# Patient Record
Sex: FEMALE | Race: BLACK OR AFRICAN AMERICAN | NOT HISPANIC OR LATINO | Employment: FULL TIME | RURAL
[De-identification: names, ages, dates, MRNs, and addresses within clinical notes are randomized per-mention and may not be internally consistent; named-entity substitution may affect disease eponyms.]

---

## 2022-08-10 ENCOUNTER — HOSPITAL ENCOUNTER (OUTPATIENT)
Facility: HOSPITAL | Age: 32
Discharge: HOME | End: 2022-08-11
Attending: HOSPITALIST | Admitting: HOSPITALIST
Payer: COMMERCIAL

## 2022-08-10 DIAGNOSIS — E86.0 DEHYDRATION: Primary | ICD-10-CM

## 2022-08-10 DIAGNOSIS — O46.8X1 SUBCHORIONIC HEMATOMA IN FIRST TRIMESTER: ICD-10-CM

## 2022-08-10 DIAGNOSIS — Z3A.08 8 WEEKS GESTATION OF PREGNANCY: ICD-10-CM

## 2022-08-10 DIAGNOSIS — R55 SYNCOPE: ICD-10-CM

## 2022-08-10 DIAGNOSIS — O41.8X10 SUBCHORIONIC HEMATOMA IN FIRST TRIMESTER: ICD-10-CM

## 2022-08-10 PROBLEM — R19.7 NAUSEA VOMITING AND DIARRHEA: Status: ACTIVE | Noted: 2022-08-10

## 2022-08-10 PROBLEM — R11.2 NAUSEA VOMITING AND DIARRHEA: Status: ACTIVE | Noted: 2022-08-10

## 2022-08-10 LAB
ALBUMIN SERPL BCP-MCNC: 3.2 G/DL (ref 3.5–5)
ALBUMIN/GLOB SERPL: 0.7 {RATIO}
ALP SERPL-CCNC: 100 U/L (ref 37–98)
ALT SERPL W P-5'-P-CCNC: 20 U/L (ref 13–56)
ANION GAP SERPL CALCULATED.3IONS-SCNC: 16 MMOL/L (ref 7–16)
AST SERPL W P-5'-P-CCNC: 11 U/L (ref 15–37)
BACTERIA #/AREA URNS HPF: ABNORMAL /HPF
BASOPHILS # BLD AUTO: 0.02 K/UL (ref 0–0.2)
BASOPHILS NFR BLD AUTO: 0.1 % (ref 0–1)
BILIRUB SERPL-MCNC: 0.2 MG/DL (ref 0–1.2)
BILIRUB UR QL STRIP: ABNORMAL
BUN SERPL-MCNC: 7 MG/DL (ref 7–18)
BUN/CREAT SERPL: 10 (ref 6–20)
CALCIUM SERPL-MCNC: 9 MG/DL (ref 8.5–10.1)
CHLORIDE SERPL-SCNC: 100 MMOL/L (ref 98–107)
CLARITY UR: ABNORMAL
CO2 SERPL-SCNC: 23 MMOL/L (ref 21–32)
COLOR UR: YELLOW
CREAT SERPL-MCNC: 0.67 MG/DL (ref 0.55–1.02)
DIFFERENTIAL METHOD BLD: ABNORMAL
EGFR (NO RACE VARIABLE) (RUSH/TITUS): 121 ML/MIN/1.73M²
EOSINOPHIL # BLD AUTO: 0.06 K/UL (ref 0–0.5)
EOSINOPHIL NFR BLD AUTO: 0.4 % (ref 1–4)
ERYTHROCYTE [DISTWIDTH] IN BLOOD BY AUTOMATED COUNT: 18.2 % (ref 11.5–14.5)
FLUAV AG UPPER RESP QL IA.RAPID: NEGATIVE
FLUBV AG UPPER RESP QL IA.RAPID: NEGATIVE
GLOBULIN SER-MCNC: 4.9 G/DL (ref 2–4)
GLUCOSE SERPL-MCNC: 103 MG/DL (ref 74–106)
GLUCOSE UR STRIP-MCNC: NEGATIVE MG/DL
HCT VFR BLD AUTO: 29.9 % (ref 38–47)
HGB BLD-MCNC: 9.3 G/DL (ref 12–16)
KETONES UR STRIP-SCNC: ABNORMAL MG/DL
LEUKOCYTE ESTERASE UR QL STRIP: ABNORMAL
LYMPHOCYTES # BLD AUTO: 0.67 K/UL (ref 1–4.8)
LYMPHOCYTES NFR BLD AUTO: 4.8 % (ref 27–41)
MAGNESIUM SERPL-MCNC: 1.6 MG/DL (ref 1.7–2.3)
MCH RBC QN AUTO: 24.5 PG (ref 27–31)
MCHC RBC AUTO-ENTMCNC: 31.1 G/DL (ref 32–36)
MCV RBC AUTO: 78.7 FL (ref 80–96)
MONOCYTES # BLD AUTO: 0.91 K/UL (ref 0–0.8)
MONOCYTES NFR BLD AUTO: 6.5 % (ref 2–6)
MPC BLD CALC-MCNC: 7.8 FL (ref 9.4–12.4)
NEUTROPHILS # BLD AUTO: 12.3 K/UL (ref 1.8–7.7)
NEUTROPHILS NFR BLD AUTO: 88.2 % (ref 53–65)
NITRITE UR QL STRIP: NEGATIVE
PH UR STRIP: 6.5 PH UNITS
PLATELET # BLD AUTO: 502 K/UL (ref 150–400)
POTASSIUM SERPL-SCNC: 3.5 MMOL/L (ref 3.5–5.1)
PROT SERPL-MCNC: 8.1 G/DL (ref 6.4–8.2)
PROT UR QL STRIP: 100
RAPID GROUP A STREP: NEGATIVE
RBC # BLD AUTO: 3.8 M/UL (ref 4.2–5.4)
RBC # UR STRIP: NEGATIVE /UL
RBC #/AREA URNS HPF: ABNORMAL /HPF
SARS-COV+SARS-COV-2 AG RESP QL IA.RAPID: NEGATIVE
SODIUM SERPL-SCNC: 135 MMOL/L (ref 136–145)
SP GR UR STRIP: 1.02
SQUAMOUS #/AREA URNS LPF: ABNORMAL /LPF
TROPONIN I SERPL HS-MCNC: <4 PG/ML
UROBILINOGEN UR STRIP-ACNC: 0.2 MG/DL
WBC # BLD AUTO: 13.96 K/UL (ref 4.5–11)
WBC #/AREA URNS HPF: ABNORMAL /HPF

## 2022-08-10 PROCEDURE — 93010 ELECTROCARDIOGRAM REPORT: CPT | Mod: ,,, | Performed by: INTERNAL MEDICINE

## 2022-08-10 PROCEDURE — G0378 HOSPITAL OBSERVATION PER HR: HCPCS

## 2022-08-10 PROCEDURE — 83735 ASSAY OF MAGNESIUM: CPT | Performed by: NURSE PRACTITIONER

## 2022-08-10 PROCEDURE — 87428 SARSCOV & INF VIR A&B AG IA: CPT | Performed by: NURSE PRACTITIONER

## 2022-08-10 PROCEDURE — 85025 COMPLETE CBC W/AUTO DIFF WBC: CPT | Performed by: NURSE PRACTITIONER

## 2022-08-10 PROCEDURE — 96361 HYDRATE IV INFUSION ADD-ON: CPT | Performed by: NURSE PRACTITIONER

## 2022-08-10 PROCEDURE — 25000003 PHARM REV CODE 250: Performed by: NURSE PRACTITIONER

## 2022-08-10 PROCEDURE — 99284 PR EMERGENCY DEPT VISIT,LEVEL IV: ICD-10-PCS | Mod: ,,, | Performed by: NURSE PRACTITIONER

## 2022-08-10 PROCEDURE — 93005 ELECTROCARDIOGRAM TRACING: CPT

## 2022-08-10 PROCEDURE — 81001 URINALYSIS AUTO W/SCOPE: CPT | Performed by: NURSE PRACTITIONER

## 2022-08-10 PROCEDURE — 84484 ASSAY OF TROPONIN QUANT: CPT | Performed by: NURSE PRACTITIONER

## 2022-08-10 PROCEDURE — 99285 EMERGENCY DEPT VISIT HI MDM: CPT | Mod: 25 | Performed by: NURSE PRACTITIONER

## 2022-08-10 PROCEDURE — 63600175 PHARM REV CODE 636 W HCPCS: Performed by: NURSE PRACTITIONER

## 2022-08-10 PROCEDURE — 36415 COLL VENOUS BLD VENIPUNCTURE: CPT | Performed by: NURSE PRACTITIONER

## 2022-08-10 PROCEDURE — 96374 THER/PROPH/DIAG INJ IV PUSH: CPT | Performed by: NURSE PRACTITIONER

## 2022-08-10 PROCEDURE — 93010 EKG 12-LEAD: ICD-10-PCS | Mod: ,,, | Performed by: INTERNAL MEDICINE

## 2022-08-10 PROCEDURE — 87880 STREP A ASSAY W/OPTIC: CPT | Performed by: NURSE PRACTITIONER

## 2022-08-10 PROCEDURE — 99284 EMERGENCY DEPT VISIT MOD MDM: CPT | Mod: ,,, | Performed by: NURSE PRACTITIONER

## 2022-08-10 PROCEDURE — 80053 COMPREHEN METABOLIC PANEL: CPT | Performed by: NURSE PRACTITIONER

## 2022-08-10 RX ORDER — ONDANSETRON 2 MG/ML
4 INJECTION INTRAMUSCULAR; INTRAVENOUS
Status: COMPLETED | OUTPATIENT
Start: 2022-08-10 | End: 2022-08-10

## 2022-08-10 RX ORDER — AMLODIPINE BESYLATE 10 MG/1
10 TABLET ORAL EVERY MORNING
Status: DISCONTINUED | OUTPATIENT
Start: 2022-08-11 | End: 2022-08-11 | Stop reason: HOSPADM

## 2022-08-10 RX ORDER — LEVETIRACETAM 500 MG/1
1000 TABLET ORAL 2 TIMES DAILY
COMMUNITY

## 2022-08-10 RX ORDER — LEVETIRACETAM 250 MG/1
500 TABLET ORAL NIGHTLY
Status: DISCONTINUED | OUTPATIENT
Start: 2022-08-10 | End: 2022-08-11 | Stop reason: HOSPADM

## 2022-08-10 RX ORDER — ACETAMINOPHEN 500 MG
1000 TABLET ORAL EVERY 6 HOURS PRN
Status: DISCONTINUED | OUTPATIENT
Start: 2022-08-10 | End: 2022-08-11 | Stop reason: HOSPADM

## 2022-08-10 RX ORDER — FOLIC ACID 1 MG/1
2 TABLET ORAL 2 TIMES DAILY
COMMUNITY

## 2022-08-10 RX ORDER — SODIUM CHLORIDE 0.9 % (FLUSH) 0.9 %
10 SYRINGE (ML) INJECTION EVERY 12 HOURS
Status: DISCONTINUED | OUTPATIENT
Start: 2022-08-10 | End: 2022-08-11 | Stop reason: HOSPADM

## 2022-08-10 RX ORDER — AMLODIPINE BESYLATE 10 MG/1
10 TABLET ORAL EVERY MORNING
COMMUNITY

## 2022-08-10 RX ORDER — ACETAMINOPHEN 500 MG
1000 TABLET ORAL EVERY 6 HOURS PRN
Status: ON HOLD | COMMUNITY
End: 2022-08-11 | Stop reason: HOSPADM

## 2022-08-10 RX ORDER — LEVETIRACETAM 250 MG/1
1000 TABLET ORAL DAILY
Status: DISCONTINUED | OUTPATIENT
Start: 2022-08-11 | End: 2022-08-11 | Stop reason: HOSPADM

## 2022-08-10 RX ORDER — SODIUM CHLORIDE 9 MG/ML
1000 INJECTION, SOLUTION INTRAVENOUS CONTINUOUS
Status: ACTIVE | OUTPATIENT
Start: 2022-08-10 | End: 2022-08-11

## 2022-08-10 RX ORDER — PRENATAL WITH FERROUS FUM AND FOLIC ACID 3080; 920; 120; 400; 22; 1.84; 3; 20; 10; 1; 12; 200; 27; 25; 2 [IU]/1; [IU]/1; MG/1; [IU]/1; MG/1; MG/1; MG/1; MG/1; MG/1; MG/1; UG/1; MG/1; MG/1; MG/1; MG/1
1 TABLET ORAL DAILY
Status: DISCONTINUED | OUTPATIENT
Start: 2022-08-11 | End: 2022-08-11 | Stop reason: HOSPADM

## 2022-08-10 RX ORDER — FOLIC ACID 1 MG/1
2 TABLET ORAL DAILY
Status: DISCONTINUED | OUTPATIENT
Start: 2022-08-11 | End: 2022-08-11 | Stop reason: HOSPADM

## 2022-08-10 RX ORDER — ONDANSETRON 2 MG/ML
4 INJECTION INTRAMUSCULAR; INTRAVENOUS EVERY 6 HOURS PRN
Status: DISCONTINUED | OUTPATIENT
Start: 2022-08-10 | End: 2022-08-11 | Stop reason: HOSPADM

## 2022-08-10 RX ORDER — LEVETIRACETAM 250 MG/1
1000 TABLET ORAL 2 TIMES DAILY
Status: DISCONTINUED | OUTPATIENT
Start: 2022-08-10 | End: 2022-08-10

## 2022-08-10 RX ADMIN — ONDANSETRON 4 MG: 2 INJECTION INTRAMUSCULAR; INTRAVENOUS at 06:08

## 2022-08-10 RX ADMIN — SODIUM CHLORIDE 1000 ML: 9 INJECTION, SOLUTION INTRAVENOUS at 07:08

## 2022-08-10 RX ADMIN — SODIUM CHLORIDE, POTASSIUM CHLORIDE, SODIUM LACTATE AND CALCIUM CHLORIDE 500 ML: 600; 310; 30; 20 INJECTION, SOLUTION INTRAVENOUS at 06:08

## 2022-08-10 RX ADMIN — LEVETIRACETAM 500 MG: 250 TABLET, FILM COATED ORAL at 09:08

## 2022-08-10 NOTE — ED PROVIDER NOTES
Encounter Date: 8/10/2022       History     Chief Complaint   Patient presents with    Loss of Consciousness     Pt reports possible syncopal episode approx 20 min PTA, was sitting in car with car in park, remembers everything before and after episode, reports 8 weeks pregnant with N/V x 2 weeks, hx HTN, seizures, aao upon arrival     Twila Dejesus is a 32 y.o. female presenting to ED after a syncopal episode while sitting in her parked car at [employer] 15-20 min PTA. She states that she was at the counter and began to feel weak so she went to her vehicle and that is where she passed out. She is unclear how long she was unconscious but state that she woke up and her son was on the phone with a family member telling them that she had passed out. She is 8 weeks pregnant and has had US to confirm IUP but states that she was told she had a hematoma that could cause complications with the pregnancy. She has a scheduled appointment with her OB in 1 week. She states that she has been experiencing a lot of vomiting and diarrhea over the last week so she contacted her OB and they called her in Zofran which she has been taking but says it only works for approx 45 min-1 hr before she is nauseated and vomiting again. No sick contacts. She also notes that she has been experiencing chills, sore throat and congestion over the last few days but denies fever. She reports that this is her third episode of syncope since she became pregnant. With the first occurrence she was told it was heat exhaustion. The second occurrence was when they found the hematoma. She also experienced rectal bleeding a few weeks ago but bleeding has subsided and she has a colonoscopy scheduled for September. She is a A2. First miscarriage was around 4 weeks gestation, second was approx 8 weeks gestation. She Currently in NAD. VSS at this time.         Review of patient's allergies indicates:   Allergen Reactions    Iodopropynyl butylcarbamate Dermatitis     Ciprofloxacin Hives and Rash    Latex Hives and Rash     History reviewed. No pertinent past medical history.  History reviewed. No pertinent surgical history.  History reviewed. No pertinent family history.  Social History     Tobacco Use    Smoking status: Never Smoker    Smokeless tobacco: Never Used   Substance Use Topics    Alcohol use: Not Currently    Drug use: Never     Review of Systems   Constitutional: Positive for activity change, appetite change, chills and fatigue. Negative for fever.   HENT: Positive for congestion and sore throat.    Eyes: Negative for visual disturbance.   Respiratory: Negative for cough, chest tightness and shortness of breath.    Cardiovascular: Negative for chest pain and palpitations.   Gastrointestinal: Positive for diarrhea, nausea and vomiting. Negative for abdominal distention, abdominal pain, anal bleeding and blood in stool.   Genitourinary: Negative for decreased urine volume, dysuria, frequency and urgency.   Neurological: Positive for syncope, weakness and light-headedness. Negative for dizziness and headaches.   Psychiatric/Behavioral: Negative for confusion.   All other systems reviewed and are negative.      Physical Exam     Initial Vitals [08/10/22 1642]   BP Pulse Resp Temp SpO2   120/76 81 16 98.3 °F (36.8 °C) 100 %      MAP       --         Physical Exam    Nursing note and vitals reviewed.  Constitutional: She appears well-developed and well-nourished. She has a sickly appearance. No distress.   HENT:   Head: Normocephalic and atraumatic.   Nose: Nose normal.   Mouth/Throat: Mucous membranes are dry.   Eyes: EOM are normal. Pupils are equal, round, and reactive to light. No scleral icterus.   Neck: Neck supple.   Normal range of motion.  Cardiovascular: Normal rate, regular rhythm, normal heart sounds and intact distal pulses.   Pulmonary/Chest: Breath sounds normal. No respiratory distress.   Abdominal: Abdomen is soft. She exhibits no distension.  Bowel sounds are increased. There is no abdominal tenderness. No hernia.   No right CVA tenderness.  No left CVA tenderness.   Musculoskeletal:         General: Normal range of motion.      Cervical back: Normal range of motion and neck supple.     Neurological: She is alert and oriented to person, place, and time. She has normal strength. No cranial nerve deficit or sensory deficit. GCS score is 15. GCS eye subscore is 4. GCS verbal subscore is 5. GCS motor subscore is 6.   Skin: Skin is warm and dry. Capillary refill takes less than 2 seconds.         Medical Screening Exam   See Full Note    ED Course   Procedures  Labs Reviewed   COMPREHENSIVE METABOLIC PANEL - Abnormal; Notable for the following components:       Result Value    Sodium 135 (*)     Albumin 3.2 (*)     Globulin 4.9 (*)     Alk Phos 100 (*)     AST 11 (*)     All other components within normal limits   MAGNESIUM - Abnormal; Notable for the following components:    Magnesium 1.6 (*)     All other components within normal limits   URINALYSIS, REFLEX TO URINE CULTURE - Abnormal; Notable for the following components:    Leukocytes, UA Trace (*)     Protein,   (*)     Bilirubin, UA Small (*)     All other components within normal limits   CBC WITH DIFFERENTIAL - Abnormal; Notable for the following components:    WBC 13.96 (*)     RBC 3.80 (*)     Hemoglobin 9.3 (*)     Hematocrit 29.9 (*)     MCV 78.7 (*)     MCH 24.5 (*)     MCHC 31.1 (*)     RDW 18.2 (*)     Platelet Count 502 (*)     MPV 7.8 (*)     Neutrophils % 88.2 (*)     Lymphocytes % 4.8 (*)     Neutrophils, Abs 12.30 (*)     Lymphocytes, Absolute 0.67 (*)     Monocytes % 6.5 (*)     Eosinophils % 0.4 (*)     Monocytes, Absolute 0.91 (*)     All other components within normal limits   URINALYSIS, MICROSCOPIC - Abnormal; Notable for the following components:    WBC, UA 5-10 (*)     Bacteria, UA Few (*)     Squamous Epithelial Cells, UA Many (*)     All other components within normal  limits   TROPONIN I - Normal   SARS-COV2 (COVID) W/ FLU ANTIGEN - Normal    Narrative:     Negative SARS-CoV results should not be used as the sole basis for treatment or patient management decisions; negative results should be considered in the context of a patient's recent exposures, history and the presene of clinical signs and symptoms consistent with COVID-19.  Negative results should be treated as presumptive and confirmed by molecular assay, if necessary for patient management.   CBC W/ AUTO DIFFERENTIAL    Narrative:     The following orders were created for panel order CBC auto differential.  Procedure                               Abnormality         Status                     ---------                               -----------         ------                     CBC with Differential[[id number]]        Abnormal            Final result               Manual Differential[[id number]]                              Final result                 Please view results for these tests on the individual orders.   MANUAL DIFFERENTIAL     EKG Readings: (Independently Interpreted)   Rhythm: Sinus Arrhythmia. Heart Rate: 78.     ECG Results          EKG 12-lead (In process)  Result time 08/10/22 16:54:35    In process by Interface, Lab In [facility] (08/10/22 16:54:35)                 Narrative:    Test Reason : R55,    Vent. Rate : 078 BPM     Atrial Rate : 078 BPM     P-R Int : 168 ms          QRS Dur : 084 ms      QT Int : 362 ms       P-R-T Axes : 053 036 039 degrees     QTc Int : 412 ms    Normal sinus rhythm with sinus arrhythmia  Normal ECG  No previous ECGs available    Referred By: [email or web address]   SELF           Confirmed By:                             Imaging Results    None          Medications   ondansetron injection 4 mg (4 mg Intravenous Given 8/10/22 1811)   lactated ringers bolus 500 mL (0 mLs Intravenous Stopped 8/10/22 1907)                 ED Course as of 08/10/22 1946   Wed Aug 10, 2022   1825 Patient reports to  be feeling some better. States that she has been taking her meds as directed but has been unable to hold down meds. This episode is not consistent with her typical seizure. Discussed results with patient and need for telemed consult. She is in agreement.  [LP]   1906 [facility] returned call. Connected with Dr. Glover. Discussed patient case and today's results. Video consult initiated and he was able to visualize and speak with patient. He discussed findings and plan. Patient V/U and is in agreement with suggested plan. No questions or concerns at this time. Call ended at 1922.      [LP]   1913 Reports is feeling better.  Awaiting Telemed consult [CG]   1926 Telemed consult via audio/ video with Dr. Williamson, who suggest Admit for Tele, IV hydration and US in the morning. He states she does not need to transfer for specialty since is only 8 weeks gestation and has had US to confirm IUP.  Will Call hospitalist on call.  [CG]   1933 Discussed pt with DR Velez who states is ok to admit for OBS for IV hydration and US in AM.  She suggest since she has had diarrhea to check for enteric pathogen, Cdiff and fecal leukocytes.  [CG]      ED Course User Index  [CG] SONU Godfrey  [LP] SONU Villeda          Clinical Impression:   Final diagnoses:  [R55] Syncope  [E86.0] Dehydration (Primary)  [Z3A.08] 8 weeks gestation of pregnancy          ED Disposition Condition    Observation               SONU Godfrey  08/10/22 1946

## 2022-08-11 VITALS
TEMPERATURE: 98 F | SYSTOLIC BLOOD PRESSURE: 125 MMHG | BODY MASS INDEX: 38.25 KG/M2 | HEART RATE: 81 BPM | OXYGEN SATURATION: 100 % | WEIGHT: 238 LBS | DIASTOLIC BLOOD PRESSURE: 87 MMHG | HEIGHT: 66 IN | RESPIRATION RATE: 20 BRPM

## 2022-08-11 PROBLEM — E86.0 DEHYDRATION: Status: RESOLVED | Noted: 2022-08-10 | Resolved: 2022-08-11

## 2022-08-11 PROBLEM — R11.2 NAUSEA VOMITING AND DIARRHEA: Status: RESOLVED | Noted: 2022-08-10 | Resolved: 2022-08-11

## 2022-08-11 PROBLEM — R19.7 NAUSEA VOMITING AND DIARRHEA: Status: RESOLVED | Noted: 2022-08-10 | Resolved: 2022-08-11

## 2022-08-11 LAB
ANION GAP SERPL CALCULATED.3IONS-SCNC: 11 MMOL/L (ref 7–16)
BASOPHILS # BLD AUTO: 0.03 K/UL (ref 0–0.2)
BASOPHILS NFR BLD AUTO: 0.3 % (ref 0–1)
BUN SERPL-MCNC: 5 MG/DL (ref 7–18)
BUN/CREAT SERPL: 9 (ref 6–20)
CALCIUM SERPL-MCNC: 8.1 MG/DL (ref 8.5–10.1)
CHLORIDE SERPL-SCNC: 105 MMOL/L (ref 98–107)
CO2 SERPL-SCNC: 23 MMOL/L (ref 21–32)
CREAT SERPL-MCNC: 0.57 MG/DL (ref 0.55–1.02)
DIFFERENTIAL METHOD BLD: ABNORMAL
EGFR (NO RACE VARIABLE) (RUSH/TITUS): 126 ML/MIN/1.73M²
EOSINOPHIL # BLD AUTO: 0.08 K/UL (ref 0–0.5)
EOSINOPHIL NFR BLD AUTO: 0.9 % (ref 1–4)
ERYTHROCYTE [DISTWIDTH] IN BLOOD BY AUTOMATED COUNT: 18.3 % (ref 11.5–14.5)
GLUCOSE SERPL-MCNC: 99 MG/DL (ref 74–106)
HCT VFR BLD AUTO: 26 % (ref 38–47)
HGB BLD-MCNC: 8 G/DL (ref 12–16)
LYMPHOCYTES # BLD AUTO: 1.67 K/UL (ref 1–4.8)
LYMPHOCYTES NFR BLD AUTO: 18.3 % (ref 27–41)
MCH RBC QN AUTO: 24.2 PG (ref 27–31)
MCHC RBC AUTO-ENTMCNC: 30.8 G/DL (ref 32–36)
MCV RBC AUTO: 78.8 FL (ref 80–96)
MONOCYTES # BLD AUTO: 1.08 K/UL (ref 0–0.8)
MONOCYTES NFR BLD AUTO: 11.9 % (ref 2–6)
MPC BLD CALC-MCNC: 7.8 FL (ref 9.4–12.4)
NEUTROPHILS # BLD AUTO: 6.25 K/UL (ref 1.8–7.7)
NEUTROPHILS NFR BLD AUTO: 68.6 % (ref 53–65)
PLATELET # BLD AUTO: 458 K/UL (ref 150–400)
POTASSIUM SERPL-SCNC: 3.6 MMOL/L (ref 3.5–5.1)
RBC # BLD AUTO: 3.3 M/UL (ref 4.2–5.4)
SODIUM SERPL-SCNC: 135 MMOL/L (ref 136–145)
WBC # BLD AUTO: 9.11 K/UL (ref 4.5–11)

## 2022-08-11 PROCEDURE — G0378 HOSPITAL OBSERVATION PER HR: HCPCS

## 2022-08-11 PROCEDURE — 36415 COLL VENOUS BLD VENIPUNCTURE: CPT | Performed by: NURSE PRACTITIONER

## 2022-08-11 PROCEDURE — 99235 HOSP IP/OBS SAME DATE MOD 70: CPT | Mod: ,,, | Performed by: HOSPITALIST

## 2022-08-11 PROCEDURE — 96361 HYDRATE IV INFUSION ADD-ON: CPT

## 2022-08-11 PROCEDURE — A4216 STERILE WATER/SALINE, 10 ML: HCPCS | Performed by: NURSE PRACTITIONER

## 2022-08-11 PROCEDURE — 25000003 PHARM REV CODE 250: Performed by: NURSE PRACTITIONER

## 2022-08-11 PROCEDURE — 99235 PR OBSERV/HOSP SAME DATE,LEVL IV: ICD-10-PCS | Mod: ,,, | Performed by: HOSPITALIST

## 2022-08-11 PROCEDURE — 85025 COMPLETE CBC W/AUTO DIFF WBC: CPT | Performed by: NURSE PRACTITIONER

## 2022-08-11 PROCEDURE — 80048 BASIC METABOLIC PNL TOTAL CA: CPT | Performed by: NURSE PRACTITIONER

## 2022-08-11 RX ADMIN — AMLODIPINE BESYLATE 10 MG: 10 TABLET ORAL at 06:08

## 2022-08-11 RX ADMIN — FOLIC ACID 2 MG: 1 TABLET ORAL at 09:08

## 2022-08-11 RX ADMIN — SODIUM CHLORIDE 1000 ML: 9 INJECTION, SOLUTION INTRAVENOUS at 12:08

## 2022-08-11 RX ADMIN — SODIUM CHLORIDE, PRESERVATIVE FREE 10 ML: 5 INJECTION INTRAVENOUS at 08:08

## 2022-08-11 RX ADMIN — PRENATAL VITAMINS-IRON FUMARATE 27 MG IRON-FOLIC ACID 0.8 MG TABLET 1 TABLET: at 09:08

## 2022-08-11 NOTE — PLAN OF CARE
Problem:  Fall Injury Risk  Goal: Absence of Fall, Infant Drop and Related Injury  Outcome: Ongoing, Progressing     Problem: Adult Inpatient Plan of Care  Goal: Plan of Care Review  Outcome: Ongoing, Progressing  Goal: Patient-Specific Goal (Individualized)  Outcome: Ongoing, Progressing  Goal: Absence of Hospital-Acquired Illness or Injury  Outcome: Ongoing, Progressing  Goal: Optimal Comfort and Wellbeing  Outcome: Ongoing, Progressing  Goal: Readiness for Transition of Care  Outcome: Ongoing, Progressing     Problem: Fall Injury Risk  Goal: Absence of Fall and Fall-Related Injury  Outcome: Ongoing, Progressing     Problem: Pain Acute  Goal: Acceptable Pain Control and Functional Ability  Outcome: Ongoing, Progressing     Problem: Infection  Goal: Absence of Infection Signs and Symptoms  Outcome: Ongoing, Progressing     Problem: Fatigue  Goal: Improved Activity Tolerance  Outcome: Ongoing, Progressing

## 2022-08-11 NOTE — ED NOTES
Assisted to BR to void. Denies feeling faint.  Back to bed and maintaining Normal Sinus Rhythm on monitor.

## 2022-08-11 NOTE — PLAN OF CARE
Problem:  Fall Injury Risk  Goal: Absence of Fall, Infant Drop and Related Injury  Outcome: Adequate for Care Transition     Problem: Adult Inpatient Plan of Care  Goal: Plan of Care Review  Outcome: Adequate for Care Transition  Goal: Patient-Specific Goal (Individualized)  Outcome: Adequate for Care Transition  Goal: Absence of Hospital-Acquired Illness or Injury  Outcome: Adequate for Care Transition  Goal: Optimal Comfort and Wellbeing  Outcome: Adequate for Care Transition  Goal: Readiness for Transition of Care  Outcome: Adequate for Care Transition     Problem: Fall Injury Risk  Goal: Absence of Fall and Fall-Related Injury  Outcome: Adequate for Care Transition     Problem: Pain Acute  Goal: Acceptable Pain Control and Functional Ability  Outcome: Adequate for Care Transition     Problem: Infection  Goal: Absence of Infection Signs and Symptoms  Outcome: Adequate for Care Transition     Problem: Fatigue  Goal: Improved Activity Tolerance  Outcome: Adequate for Care Transition

## 2022-08-11 NOTE — SUBJECTIVE & OBJECTIVE
History reviewed. No pertinent past medical history.    Past Surgical History:   Procedure Laterality Date    APPENDECTOMY       SECTION  2015    CHOLECYSTECTOMY  2016    TONSILLECTOMY  1999       Review of patient's allergies indicates:   Allergen Reactions    Iodopropynyl butylcarbamate Dermatitis    Ciprofloxacin Hives and Rash    Latex Hives and Rash       No current facility-administered medications on file prior to encounter.     Current Outpatient Medications on File Prior to Encounter   Medication Sig    acetaminophen (TYLENOL) 500 MG tablet Take 1,000 mg by mouth every 6 (six) hours as needed for Pain.    amLODIPine (NORVASC) 10 MG tablet Take 10 mg by mouth every morning.    folic acid (FOLVITE) 1 MG tablet Take 2 mg by mouth 2 (two) times daily.    levETIRAcetam (KEPPRA) 500 MG Tab Take 1,000 mg by mouth 2 (two) times daily.    prenatal vit no.124/iron/folic (PRENATAL VITAMIN ORAL) Take by mouth. Pt states she takes the gummy type prenatal vitamin daily.     Family History       Problem Relation (Age of Onset)    No Known Problems Mother, Father, Sister, Brother          Tobacco Use    Smoking status: Never Smoker    Smokeless tobacco: Never Used   Substance and Sexual Activity    Alcohol use: Not Currently    Drug use: Never    Sexual activity: Yes     Partners: Male     Review of Systems   Constitutional:  Negative for appetite change, chills and fever.   Respiratory:  Negative for cough, shortness of breath and wheezing.    Cardiovascular:  Negative for chest pain, palpitations and leg swelling.   Gastrointestinal:  Positive for diarrhea, nausea and vomiting. Negative for abdominal distention.   Genitourinary:  Negative for dysuria.   Musculoskeletal:  Positive for arthralgias.   Skin:  Negative for rash.   Neurological:  Positive for seizures. Negative for dizziness and syncope.   Psychiatric/Behavioral:  Negative for agitation, behavioral problems and confusion.    All other systems  reviewed and are negative.  Objective:     Vital Signs (Most Recent):  Temp: 98.1 °F (36.7 °C) (08/11/22 0736)  Pulse: 81 (08/11/22 0736)  Resp: 20 (08/11/22 0736)  BP: 125/87 (08/11/22 0736)  SpO2: 100 % (08/11/22 0736) Vital Signs (24h Range):  Temp:  [98.1 °F (36.7 °C)-99.1 °F (37.3 °C)] 98.1 °F (36.7 °C)  Pulse:  [81-95] 81  Resp:  [15-20] 20  SpO2:  [98 %-100 %] 100 %  BP: (120-135)/(66-87) 125/87     Weight: 108 kg (238 lb)  Body mass index is 38.41 kg/m².    Physical Exam  Vitals reviewed.   Constitutional:       General: She is not in acute distress.     Appearance: Normal appearance.   HENT:      Head: Normocephalic and atraumatic.   Eyes:      General: No scleral icterus.     Extraocular Movements: Extraocular movements intact.      Conjunctiva/sclera: Conjunctivae normal.      Pupils: Pupils are equal, round, and reactive to light.   Cardiovascular:      Rate and Rhythm: Normal rate and regular rhythm.      Heart sounds: No murmur heard.    No friction rub. No gallop.   Pulmonary:      Effort: Pulmonary effort is normal. No respiratory distress.      Breath sounds: Normal breath sounds. No wheezing or rales.   Abdominal:      General: Abdomen is flat. Bowel sounds are normal. There is no distension.      Palpations: Abdomen is soft.      Tenderness: There is no abdominal tenderness. There is no guarding.   Musculoskeletal:         General: Swelling present.      Comments: R knee brace   Skin:     General: Skin is warm and dry.      Coloration: Skin is not jaundiced.      Findings: No rash.   Neurological:      General: No focal deficit present.      Mental Status: She is alert and oriented to person, place, and time.      Sensory: No sensory deficit.      Motor: No weakness.   Psychiatric:         Mood and Affect: Mood normal.         Thought Content: Thought content normal.         Judgment: Judgment normal.         CRANIAL NERVES     CN III, IV, VI   Pupils are equal, round, and reactive to light.      Significant Labs: All pertinent labs within the past 24 hours have been reviewed.  Recent Lab Results         08/11/22  0607   08/10/22  2140   08/10/22  1752   08/10/22  1708        Influenza B, Molecular       Negative       Albumin/Globulin Ratio     0.7         Albumin     3.2         Alkaline Phosphatase     100         ALT     20         Anion Gap 11     16         Appearance, UA       Cloudy       AST     11         Bacteria, UA       Few       Baso # 0.03     0.02         Basophil % 0.3     0.1         Bilirubin (UA)       Small       BILIRUBIN TOTAL     0.2         BUN 5     7         BUN/CREAT RATIO 9     10         Calcium 8.1     9.0         Chloride 105     100         CO2 23     23         Color, UA       Yellow       COVID-19 Ag       Negative       Creatinine 0.57     0.67         Differential Type Scan Smear     Manual         eGFR 126     121         Eos # 0.08     0.06         Eosinophil % 0.9     0.4         Globulin, Total     4.9         Glucose 99     103         Glucose, UA       Negative       Hematocrit 26.0     29.9         Hemoglobin 8.0     9.3         Influenza A       Negative       Ketones, UA       Trace       Leukocytes, UA       Trace       Lymph # 1.67     0.67         Lymph % 18.3     4.8         Magnesium     1.6         MCH 24.2     24.5         MCHC 30.8     31.1         MCV 78.8     78.7         Mono # 1.08     0.91         Mono % 11.9     6.5         MPV 7.8     7.8         Neutrophils, Abs 6.25     12.30         Neutrophils Relative 68.6     88.2         NITRITE UA       Negative       Occult Blood UA       Negative       pH, UA       6.5       Platelets 458     502         Potassium 3.6     3.5         PROTEIN TOTAL     8.1         Protein, UA       100        RAPID STREP A SCREEN   Negative           RBC 3.30     3.80         RBC, UA       0-3       RDW 18.3     18.2         Sodium 135     135         Specific [place], UA       1.025       Squam Epithel, UA       Many        Troponin I High Sensitivity     <4.0         UROBILINOGEN UA       0.2       WBC, UA       5-10       WBC 9.11     13.96                 Significant Imaging: I have reviewed all pertinent imaging results/findings within the past 24 hours.

## 2022-08-11 NOTE — NURSING
4 eye assessment         8/11/2022  07:00  Skin Assessed: no new pressure injury noted.  Report received: LEONOR Flores

## 2022-08-11 NOTE — DISCHARGE SUMMARY
[facility]  Hospital Medicine  Discharge Summary      Patient Name: Twila Dejesus  MRN: [id number]  Patient Class: OP- Observation  Admission Date: 8/10/2022  Hospital Length of Stay: 0 days  Discharge Date and Time:  2022 9:22 AM  Attending Physician: Kain Nicole DO   Discharging Provider: aKin Nicole DO  Primary Care Provider: Miguel Kelly NP      HPI:   Ms Dejesus is a 33 yo AAF with a known hx of HTN and Seizure disorder who presented to the ED after a syncopal episode while sitting in her parked car at [employer] 15-20 min PTA. She states that she was at the counter and began to feel weak so she went to her vehicle and that is where she passed out. She is 8 weeks pregnant and has had US to confirm IUP but states that she was told she had a hematoma that could cause complications with the pregnancy. She has a scheduled appointment with her OB, Charlene Esquivel on . She states that she has been experiencing a lot of vomiting and diarrhea over the last week so she contacted her OB and they called her in Zofran which she has been taking but says it only works for approx 45 min-1 hr before she is nauseated and vomiting again.  She reports that this is her third episode of syncope since she became pregnant. With the first occurrence she was told it was heat exhaustion. The second occurrence was when they found the hematoma. She also experienced rectal bleeding a few weeks ago but bleeding has resolved but she has a colonoscopy scheduled for September. She is a A2. First miscarriage was around 4 weeks gestation, second was approx 8 weeks gestation. Telemed consult with [facility] who suggest admitting for telemetry, IV hydration and repeat US in the AM.  He states since she is not reporting abdominal pain or vaginal bleeding and is only 8 weeks gestation that she doesn't need to transfer for OB.  Discussed pt status, POC and meds with Dr. Velez.         * No surgery  found *      Hospital Course:   Feels better after IVFs overnight.  Ok to DC home.  She has OB follow up next week.  Has anti-nausea meds.  She will be off of work at home so will avoid heat.         Goals of Care Treatment Preferences:  Code Status: Full Code      Consults:     No new Assessment & Plan notes have been filed under this hospital service since the last note was generated.  Service: Hospital Medicine    Final Active Diagnoses:    Diagnosis Date Noted POA    Syncopal episodes [R55] 08/10/2022 Yes      Problems Resolved During this Admission:    Diagnosis Date Noted Date Resolved POA    PRINCIPAL PROBLEM:  Dehydration [E86.0] 08/10/2022 08/11/2022 Yes    Nausea vomiting and diarrhea [R11.2, R19.7] 08/10/2022 08/11/2022 Yes       Discharged Condition: good    Disposition: Home or Self Care    Follow Up:    Patient Instructions:   No discharge procedures on file.    Significant Diagnostic Studies: Labs:   BMP:   Recent Labs   Lab 08/10/22  1752 08/11/22  0607    99   * 135*   K 3.5 3.6    105   CO2 23 23   BUN 7 5*   CREATININE 0.67 0.57   CALCIUM 9.0 8.1*   MG 1.6*  --        Pending Diagnostic Studies:     None         Medications:  Reconciled Home Medications:      Medication List      CONTINUE taking these medications    amLODIPine 10 MG tablet  Commonly known as: NORVASC  Take 10 mg by mouth every morning.     folic acid 1 MG tablet  Commonly known as: FOLVITE  Take 2 mg by mouth 2 (two) times daily.     levETIRAcetam 500 MG Tab  Commonly known as: KEPPRA  Take 1,000 mg by mouth 2 (two) times daily.     PRENATAL VITAMIN ORAL  Take by mouth. Pt states she takes the gummy type prenatal vitamin daily.        STOP taking these medications    acetaminophen 500 MG tablet  Commonly known as: TYLENOL            Indwelling Lines/Drains at time of discharge:   Lines/Drains/Airways     None                 Time spent on the discharge of patient: 24 minutes         Kain Nicole  DO  Department of Hospital Medicine  [facility]

## 2022-08-11 NOTE — HOSPITAL COURSE
Feels better after IVFs overnight.  Ok to DC home.  She has OB follow up next week.  Has anti-nausea meds.  She will be off of work at home so will avoid heat.

## 2022-08-11 NOTE — HPI
Ms Dejesus is a 31 yo AAF with a known hx of HTN and Seizure disorder who presented to the ED after a syncopal episode while sitting in her parked car at [employer] 15-20 min PTA. She states that she was at the counter and began to feel weak so she went to her vehicle and that is where she passed out. She is 8 weeks pregnant and has had US to confirm IUP but states that she was told she had a hematoma that could cause complications with the pregnancy. She has a scheduled appointment with her OB, Charlene Esquivel on . She states that she has been experiencing a lot of vomiting and diarrhea over the last week so she contacted her OB and they called her in Zofran which she has been taking but says it only works for approx 45 min-1 hr before she is nauseated and vomiting again.  She reports that this is her third episode of syncope since she became pregnant. With the first occurrence she was told it was heat exhaustion. The second occurrence was when they found the hematoma. She also experienced rectal bleeding a few weeks ago but bleeding has resolved but she has a colonoscopy scheduled for September. She is a A2. First miscarriage was around 4 weeks gestation, second was approx 8 weeks gestation. Telemed consult with [facility] who suggest admitting for telemetry, IV hydration and repeat US in the AM.  He states since she is not reporting abdominal pain or vaginal bleeding and is only 8 weeks gestation that she doesn't need to transfer for OB.  Discussed pt status, POC and meds with Dr. Velez.

## 2022-08-11 NOTE — H&P
[facility] Medicine  History & Physical    Patient Name: Twila Dejesus  MRN: [id number]  Patient Class: OP- Observation  Admission Date: 8/10/2022  Attending Physician: Kain Nicole DO   Primary Care Provider: Miguel Kelly NP         Patient information was obtained from patient, past medical records and ER records.     Subjective:     Principal Problem:Dehydration    Chief Complaint:   Chief Complaint   Patient presents with    Loss of Consciousness     Pt reports possible syncopal episode approx 20 min PTA, was sitting in car with car in park, remembers everything before and after episode, reports 8 weeks pregnant with N/V x 2 weeks, hx HTN, seizures, aao upon arrival        HPI: Ms Dejesus is a 31 yo AAF with a known hx of HTN and Seizure disorder who presented to the ED after a syncopal episode while sitting in her parked car at [employer] 15-20 min PTA. She states that she was at the counter and began to feel weak so she went to her vehicle and that is where she passed out. She is 8 weeks pregnant and has had US to confirm IUP but states that she was told she had a hematoma that could cause complications with the pregnancy. She has a scheduled appointment with her OB, Charlene Esquivel on . She states that she has been experiencing a lot of vomiting and diarrhea over the last week so she contacted her OB and they called her in Zofran which she has been taking but says it only works for approx 45 min-1 hr before she is nauseated and vomiting again.  She reports that this is her third episode of syncope since she became pregnant. With the first occurrence she was told it was heat exhaustion. The second occurrence was when they found the hematoma. She also experienced rectal bleeding a few weeks ago but bleeding has resolved but she has a colonoscopy scheduled for September. She is a A2. First miscarriage was around 4 weeks gestation, second was approx 8 weeks  gestation. Telemed consult with [facility] who suggest admitting for telemetry, IV hydration and repeat US in the AM.  He states since she is not reporting abdominal pain or vaginal bleeding and is only 8 weeks gestation that she doesn't need to transfer for OB.  Discussed pt status, POC and meds with Dr. Velez.         History reviewed. No pertinent past medical history.    Past Surgical History:   Procedure Laterality Date    APPENDECTOMY       SECTION  2015    CHOLECYSTECTOMY  2016    TONSILLECTOMY         Review of patient's allergies indicates:   Allergen Reactions    Iodopropynyl butylcarbamate Dermatitis    Ciprofloxacin Hives and Rash    Latex Hives and Rash       No current facility-administered medications on file prior to encounter.     Current Outpatient Medications on File Prior to Encounter   Medication Sig    acetaminophen (TYLENOL) 500 MG tablet Take 1,000 mg by mouth every 6 (six) hours as needed for Pain.    amLODIPine (NORVASC) 10 MG tablet Take 10 mg by mouth every morning.    folic acid (FOLVITE) 1 MG tablet Take 2 mg by mouth 2 (two) times daily.    levETIRAcetam (KEPPRA) 500 MG Tab Take 1,000 mg by mouth 2 (two) times daily.    prenatal vit no.124/iron/folic (PRENATAL VITAMIN ORAL) Take by mouth. Pt states she takes the gummy type prenatal vitamin daily.     Family History       Problem Relation (Age of Onset)    No Known Problems Mother, Father, Sister, Brother          Tobacco Use    Smoking status: Never Smoker    Smokeless tobacco: Never Used   Substance and Sexual Activity    Alcohol use: Not Currently    Drug use: Never    Sexual activity: Yes     Partners: Male     Review of Systems   Constitutional:  Negative for appetite change, chills and fever.   Respiratory:  Negative for cough, shortness of breath and wheezing.    Cardiovascular:  Negative for chest pain, palpitations and leg swelling.   Gastrointestinal:  Positive for diarrhea, nausea and vomiting.  Negative for abdominal distention.   Genitourinary:  Negative for dysuria.   Musculoskeletal:  Positive for arthralgias.   Skin:  Negative for rash.   Neurological:  Positive for seizures. Negative for dizziness and syncope.   Psychiatric/Behavioral:  Negative for agitation, behavioral problems and confusion.    All other systems reviewed and are negative.  Objective:     Vital Signs (Most Recent):  Temp: 98.1 °F (36.7 °C) (08/11/22 0736)  Pulse: 81 (08/11/22 0736)  Resp: 20 (08/11/22 0736)  BP: 125/87 (08/11/22 0736)  SpO2: 100 % (08/11/22 0736) Vital Signs (24h Range):  Temp:  [98.1 °F (36.7 °C)-99.1 °F (37.3 °C)] 98.1 °F (36.7 °C)  Pulse:  [81-95] 81  Resp:  [15-20] 20  SpO2:  [98 %-100 %] 100 %  BP: (120-135)/(66-87) 125/87     Weight: 108 kg (238 lb)  Body mass index is 38.41 kg/m².    Physical Exam  Vitals reviewed.   Constitutional:       General: She is not in acute distress.     Appearance: Normal appearance.   HENT:      Head: Normocephalic and atraumatic.   Eyes:      General: No scleral icterus.     Extraocular Movements: Extraocular movements intact.      Conjunctiva/sclera: Conjunctivae normal.      Pupils: Pupils are equal, round, and reactive to light.   Cardiovascular:      Rate and Rhythm: Normal rate and regular rhythm.      Heart sounds: No murmur heard.    No friction rub. No gallop.   Pulmonary:      Effort: Pulmonary effort is normal. No respiratory distress.      Breath sounds: Normal breath sounds. No wheezing or rales.   Abdominal:      General: Abdomen is flat. Bowel sounds are normal. There is no distension.      Palpations: Abdomen is soft.      Tenderness: There is no abdominal tenderness. There is no guarding.   Musculoskeletal:         General: Swelling present.      Comments: R knee brace   Skin:     General: Skin is warm and dry.      Coloration: Skin is not jaundiced.      Findings: No rash.   Neurological:      General: No focal deficit present.      Mental Status: She is alert  and oriented to person, place, and time.      Sensory: No sensory deficit.      Motor: No weakness.   Psychiatric:         Mood and Affect: Mood normal.         Thought Content: Thought content normal.         Judgment: Judgment normal.         CRANIAL NERVES     CN III, IV, VI   Pupils are equal, round, and reactive to light.     Significant Labs: All pertinent labs within the past 24 hours have been reviewed.  Recent Lab Results         08/11/22  0607   08/10/22  2140   08/10/22  1752   08/10/22  1708        Influenza B, Molecular       Negative       Albumin/Globulin Ratio     0.7         Albumin     3.2         Alkaline Phosphatase     100         ALT     20         Anion Gap 11     16         Appearance, UA       Cloudy       AST     11         Bacteria, UA       Few       Baso # 0.03     0.02         Basophil % 0.3     0.1         Bilirubin (UA)       Small       BILIRUBIN TOTAL     0.2         BUN 5     7         BUN/CREAT RATIO 9     10         Calcium 8.1     9.0         Chloride 105     100         CO2 23     23         Color, UA       Yellow       COVID-19 Ag       Negative       Creatinine 0.57     0.67         Differential Type Scan Smear     Manual         eGFR 126     121         Eos # 0.08     0.06         Eosinophil % 0.9     0.4         Globulin, Total     4.9         Glucose 99     103         Glucose, UA       Negative       Hematocrit 26.0     29.9         Hemoglobin 8.0     9.3         Influenza A       Negative       Ketones, UA       Trace       Leukocytes, UA       Trace       Lymph # 1.67     0.67         Lymph % 18.3     4.8         Magnesium     1.6         MCH 24.2     24.5         MCHC 30.8     31.1         MCV 78.8     78.7         Mono # 1.08     0.91         Mono % 11.9     6.5         MPV 7.8     7.8         Neutrophils, Abs 6.25     12.30         Neutrophils Relative 68.6     88.2         NITRITE UA       Negative       Occult Blood UA       Negative       pH, UA       6.5        Platelets 458     502         Potassium 3.6     3.5         PROTEIN TOTAL     8.1         Protein, UA       100        RAPID STREP A SCREEN   Negative           RBC 3.30     3.80         RBC, UA       0-3       RDW 18.3     18.2         Sodium 135     135         Specific [place], UA       1.025       Squam Epithel, UA       Many       Troponin I High Sensitivity     <4.0         UROBILINOGEN UA       0.2       WBC, UA       5-10       WBC 9.11     13.96                 Significant Imaging: I have reviewed all pertinent imaging results/findings within the past 24 hours.    Assessment/Plan:     * Dehydration  IV hydration  Monitor VS  Feels better after IVFs      Nausea vomiting and diarrhea  Suspect HG but will defer to OB/GYN.  Has follow up next Wed.       Syncopal episodes  Likely multifactorial.  Could be seizure vs migraine vs vagal.        VTE Risk Mitigation (From admission, onward)         Ordered     IP VTE HIGH RISK PATIENT  Once         08/10/22 2042     Place sequential compression device  Until discontinued         08/10/22 2042                   Kain Nicole DO  Department of Hospital Medicine   [facility]

## 2022-10-01 ENCOUNTER — HOSPITAL ENCOUNTER (EMERGENCY)
Facility: HOSPITAL | Age: 32
Discharge: HOME | End: 2022-10-01
Payer: COMMERCIAL

## 2022-10-01 VITALS
HEIGHT: 66 IN | DIASTOLIC BLOOD PRESSURE: 97 MMHG | RESPIRATION RATE: 18 BRPM | BODY MASS INDEX: 38.25 KG/M2 | SYSTOLIC BLOOD PRESSURE: 143 MMHG | WEIGHT: 238 LBS | OXYGEN SATURATION: 100 % | HEART RATE: 108 BPM | TEMPERATURE: 98 F

## 2022-10-01 DIAGNOSIS — H65.01 RIGHT ACUTE SEROUS OTITIS MEDIA, RECURRENCE NOT SPECIFIED: Primary | ICD-10-CM

## 2022-10-01 DIAGNOSIS — H92.01 OTALGIA OF RIGHT EAR: ICD-10-CM

## 2022-10-01 PROCEDURE — 99283 EMERGENCY DEPT VISIT LOW MDM: CPT | Mod: ,,, | Performed by: NURSE PRACTITIONER

## 2022-10-01 PROCEDURE — 99283 PR EMERGENCY DEPT VISIT,LEVEL III: ICD-10-PCS | Mod: ,,, | Performed by: NURSE PRACTITIONER

## 2022-10-01 PROCEDURE — 99283 EMERGENCY DEPT VISIT LOW MDM: CPT

## 2022-10-01 PROCEDURE — 25000003 PHARM REV CODE 250: Performed by: NURSE PRACTITIONER

## 2022-10-01 RX ORDER — CETIRIZINE HYDROCHLORIDE 10 MG/1
10 TABLET ORAL DAILY
Qty: 30 TABLET | Refills: 0 | Status: SHIPPED | OUTPATIENT
Start: 2022-10-01 | End: 2023-10-01

## 2022-10-01 RX ORDER — ACETAMINOPHEN 500 MG
500 TABLET ORAL
Status: COMPLETED | OUTPATIENT
Start: 2022-10-01 | End: 2022-10-01

## 2022-10-01 RX ORDER — CEFDINIR 300 MG/1
300 CAPSULE ORAL 2 TIMES DAILY
Qty: 20 CAPSULE | Refills: 0 | Status: SHIPPED | OUTPATIENT
Start: 2022-10-01 | End: 2022-10-11

## 2022-10-01 RX ADMIN — ACETAMINOPHEN 500 MG: 500 TABLET, FILM COATED ORAL at 10:10

## 2022-10-02 NOTE — ED TRIAGE NOTES
32 year old female presents to ER with c/o severe right ear pain onset 3PM. Pt states she is 16 weeks pregnant and has a hx of hyperemesis Gravidarum. Pt states she has N/V/D daily but takes medication for it.   Pt is AA&O x 3, skin is warm and dry to touch, respirations are even and non labored.  FHT---144-150 strong and regular.

## 2022-10-02 NOTE — ED PROVIDER NOTES
Encounter Date: 10/1/2022       History     Chief Complaint   Patient presents with    Otalgia     31 y/o AAF 16 weeks pregnant with PMHx of hyperemesis, seizures, and HTN presents pov per step-father with c/o right ear pain with onset 6 hours pta. Dx with the flu 3 days ago.    Review of patient's allergies indicates:   Allergen Reactions    Iodopropynyl butylcarbamate Dermatitis    Ciprofloxacin Hives and Rash    Latex Hives and Rash     Past Medical History:   Diagnosis Date    Mild hyperemesis gravidarum 10/01/2022     Past Surgical History:   Procedure Laterality Date    APPENDECTOMY       SECTION  2015    CHOLECYSTECTOMY  2016    TONSILLECTOMY       Family History   Problem Relation Age of Onset    No Known Problems Mother     No Known Problems Father     No Known Problems Sister     No Known Problems Brother      Social History     Tobacco Use    Smoking status: Never    Smokeless tobacco: Never   Substance Use Topics    Alcohol use: Not Currently    Drug use: Never     Review of Systems   Constitutional:  Positive for fever.   HENT:  Positive for ear pain.    Respiratory:  Negative for apnea, cough, choking, chest tightness, shortness of breath, wheezing and stridor.    Cardiovascular:  Negative for chest pain, palpitations and leg swelling.   All other systems reviewed and are negative.    Physical Exam     Initial Vitals [10/01/22 2135]   BP Pulse Resp Temp SpO2   (!) 159/102 94 20 98.6 °F (37 °C) 99 %      MAP       --         Physical Exam    Nursing note and vitals reviewed.  Constitutional: She appears well-developed and well-nourished. She appears distressed.   HENT:   Head: Normocephalic.   Left Ear: Ear canal normal. Tympanic membrane is injected and erythematous.   Eyes: Conjunctivae and EOM are normal.   Neck: Neck supple.   Normal range of motion.  Cardiovascular:  Normal rate, regular rhythm, normal heart sounds and intact distal pulses.     Exam reveals no gallop and no friction  rub.       No murmur heard.  Pulmonary/Chest: Breath sounds normal. No respiratory distress. She has no wheezes. She has no rhonchi. She has no rales. She exhibits no tenderness.   Musculoskeletal:         General: Normal range of motion.      Cervical back: Normal range of motion and neck supple.     Neurological: She is alert and oriented to person, place, and time. She has normal strength.   Skin: Skin is warm and dry. Capillary refill takes less than 2 seconds.   Psychiatric: She has a normal mood and affect. Her behavior is normal. Judgment and thought content normal.       Medical Screening Exam   See Full Note    ED Course   Procedures  Labs Reviewed - No data to display       Imaging Results    None          Medications   acetaminophen tablet 500 mg (500 mg Oral Given 10/1/22 2218)                       Clinical Impression:   Final diagnoses:  [H92.01] Otalgia of right ear  [H65.01] Right acute serous otitis media, recurrence not specified (Primary)      ED Disposition Condition    Discharge Stable          ED Prescriptions       Medication Sig Dispense Start Date End Date Auth. Provider    cefdinir (OMNICEF) 300 MG capsule Take 1 capsule (300 mg total) by mouth 2 (two) times daily. for 10 days 20 capsule 10/1/2022 10/11/2022 SONU Jones    cetirizine (ZYRTEC) 10 MG tablet Take 1 tablet (10 mg total) by mouth once daily. 30 tablet 10/1/2022 10/1/2023 SONU Jones          Follow-up Information       Follow up With Specialties Details Why Contact Info    Miguel Kelly NP Internal Medicine Go to  As needed, for follow up [address] [place] [id number]  [phone]               SONU Jones  10/01/22 2226       SONU Jones  10/01/22 1295

## 2024-04-16 ENCOUNTER — HOSPITAL ENCOUNTER (EMERGENCY)
Facility: HOSPITAL | Age: 34
Discharge: HOME | End: 2024-04-16
Payer: COMMERCIAL

## 2024-04-16 VITALS
SYSTOLIC BLOOD PRESSURE: 151 MMHG | BODY MASS INDEX: 36 KG/M2 | OXYGEN SATURATION: 99 % | HEIGHT: 66 IN | WEIGHT: 224 LBS | DIASTOLIC BLOOD PRESSURE: 100 MMHG | RESPIRATION RATE: 18 BRPM | HEART RATE: 83 BPM | TEMPERATURE: 99 F

## 2024-04-16 DIAGNOSIS — R51.9 ACUTE NONINTRACTABLE HEADACHE, UNSPECIFIED HEADACHE TYPE: Primary | ICD-10-CM

## 2024-04-16 DIAGNOSIS — I10 PRIMARY HYPERTENSION: ICD-10-CM

## 2024-04-16 DIAGNOSIS — E87.6 HYPOKALEMIA: ICD-10-CM

## 2024-04-16 LAB
ALBUMIN SERPL BCP-MCNC: 3.3 G/DL (ref 3.5–5)
ALBUMIN/GLOB SERPL: 0.8 {RATIO}
ALP SERPL-CCNC: 135 U/L (ref 37–98)
ALT SERPL W P-5'-P-CCNC: 25 U/L (ref 13–56)
ANION GAP SERPL CALCULATED.3IONS-SCNC: 14 MMOL/L (ref 7–16)
AST SERPL W P-5'-P-CCNC: 15 U/L (ref 15–37)
BASOPHILS # BLD AUTO: 0.04 K/UL (ref 0–0.2)
BASOPHILS NFR BLD AUTO: 0.5 % (ref 0–1)
BILIRUB SERPL-MCNC: <0.1 MG/DL (ref ?–1.2)
BUN SERPL-MCNC: 7 MG/DL (ref 7–18)
BUN/CREAT SERPL: 11 (ref 6–20)
CALCIUM SERPL-MCNC: 8.6 MG/DL (ref 8.5–10.1)
CHLORIDE SERPL-SCNC: 104 MMOL/L (ref 98–107)
CO2 SERPL-SCNC: 26 MMOL/L (ref 21–32)
CREAT SERPL-MCNC: 0.66 MG/DL (ref 0.55–1.02)
DIFFERENTIAL METHOD BLD: ABNORMAL
EGFR (NO RACE VARIABLE) (RUSH/TITUS): 120 ML/MIN/1.73M2
EOSINOPHIL # BLD AUTO: 0.23 K/UL (ref 0–0.5)
EOSINOPHIL NFR BLD AUTO: 2.7 % (ref 1–4)
ERYTHROCYTE [DISTWIDTH] IN BLOOD BY AUTOMATED COUNT: 15.3 % (ref 11.5–14.5)
GLOBULIN SER-MCNC: 4.1 G/DL (ref 2–4)
GLUCOSE SERPL-MCNC: 158 MG/DL (ref 70–105)
GLUCOSE SERPL-MCNC: 196 MG/DL (ref 74–106)
HCT VFR BLD AUTO: 35 % (ref 38–47)
HGB BLD-MCNC: 10.9 G/DL (ref 12–16)
LYMPHOCYTES # BLD AUTO: 3.24 K/UL (ref 1–4.8)
LYMPHOCYTES NFR BLD AUTO: 38 % (ref 27–41)
MCH RBC QN AUTO: 27.5 PG (ref 27–31)
MCHC RBC AUTO-ENTMCNC: 31.1 G/DL (ref 32–36)
MCV RBC AUTO: 88.2 FL (ref 80–96)
MONOCYTES # BLD AUTO: 0.59 K/UL (ref 0–0.8)
MONOCYTES NFR BLD AUTO: 6.9 % (ref 2–6)
MPC BLD CALC-MCNC: 8.3 FL (ref 9.4–12.4)
NEUTROPHILS # BLD AUTO: 4.43 K/UL (ref 1.8–7.7)
NEUTROPHILS NFR BLD AUTO: 51.9 % (ref 53–65)
PLATELET # BLD AUTO: 453 K/UL (ref 150–400)
POTASSIUM SERPL-SCNC: 3.1 MMOL/L (ref 3.5–5.1)
PROT SERPL-MCNC: 7.4 G/DL (ref 6.4–8.2)
RBC # BLD AUTO: 3.97 M/UL (ref 4.2–5.4)
SODIUM SERPL-SCNC: 141 MMOL/L (ref 136–145)
WBC # BLD AUTO: 8.53 K/UL (ref 4.5–11)

## 2024-04-16 PROCEDURE — 82962 GLUCOSE BLOOD TEST: CPT

## 2024-04-16 PROCEDURE — 25000003 PHARM REV CODE 250: Performed by: NURSE PRACTITIONER

## 2024-04-16 PROCEDURE — 80053 COMPREHEN METABOLIC PANEL: CPT | Performed by: NURSE PRACTITIONER

## 2024-04-16 PROCEDURE — 99285 EMERGENCY DEPT VISIT HI MDM: CPT | Mod: 25

## 2024-04-16 PROCEDURE — 63600175 PHARM REV CODE 636 W HCPCS

## 2024-04-16 PROCEDURE — 96374 THER/PROPH/DIAG INJ IV PUSH: CPT

## 2024-04-16 PROCEDURE — 99284 EMERGENCY DEPT VISIT MOD MDM: CPT | Mod: ,,, | Performed by: NURSE PRACTITIONER

## 2024-04-16 PROCEDURE — 63600175 PHARM REV CODE 636 W HCPCS: Performed by: NURSE PRACTITIONER

## 2024-04-16 PROCEDURE — 96376 TX/PRO/DX INJ SAME DRUG ADON: CPT

## 2024-04-16 PROCEDURE — 96372 THER/PROPH/DIAG INJ SC/IM: CPT | Performed by: NURSE PRACTITIONER

## 2024-04-16 PROCEDURE — 96375 TX/PRO/DX INJ NEW DRUG ADDON: CPT

## 2024-04-16 PROCEDURE — 85025 COMPLETE CBC W/AUTO DIFF WBC: CPT | Performed by: NURSE PRACTITIONER

## 2024-04-16 RX ORDER — MORPHINE SULFATE 4 MG/ML
4 INJECTION, SOLUTION INTRAMUSCULAR; INTRAVENOUS
Status: COMPLETED | OUTPATIENT
Start: 2024-04-16 | End: 2024-04-16

## 2024-04-16 RX ORDER — HYDRALAZINE HYDROCHLORIDE 20 MG/ML
10 INJECTION INTRAMUSCULAR; INTRAVENOUS
Status: COMPLETED | OUTPATIENT
Start: 2024-04-16 | End: 2024-04-16

## 2024-04-16 RX ORDER — CARVEDILOL 6.25 MG/1
6.25 TABLET ORAL 2 TIMES DAILY WITH MEALS
COMMUNITY

## 2024-04-16 RX ORDER — POTASSIUM CHLORIDE 20 MEQ/1
20 TABLET, EXTENDED RELEASE ORAL DAILY
Qty: 5 TABLET | Refills: 0 | Status: SHIPPED | OUTPATIENT
Start: 2024-04-16 | End: 2024-04-21

## 2024-04-16 RX ORDER — POTASSIUM CHLORIDE 20 MEQ/1
20 TABLET, EXTENDED RELEASE ORAL
Status: COMPLETED | OUTPATIENT
Start: 2024-04-16 | End: 2024-04-16

## 2024-04-16 RX ORDER — CLONIDINE HYDROCHLORIDE 0.1 MG/1
0.1 TABLET ORAL
Status: COMPLETED | OUTPATIENT
Start: 2024-04-16 | End: 2024-04-16

## 2024-04-16 RX ORDER — CLONIDINE HYDROCHLORIDE 0.1 MG/1
0.1 TABLET ORAL 2 TIMES DAILY PRN
Qty: 30 TABLET | Refills: 0 | Status: SHIPPED | OUTPATIENT
Start: 2024-04-16 | End: 2025-04-16

## 2024-04-16 RX ORDER — DAPAGLIFLOZIN 5 MG/1
5 TABLET, FILM COATED ORAL DAILY
COMMUNITY

## 2024-04-16 RX ORDER — ONDANSETRON HYDROCHLORIDE 2 MG/ML
4 INJECTION, SOLUTION INTRAVENOUS
Status: COMPLETED | OUTPATIENT
Start: 2024-04-16 | End: 2024-04-16

## 2024-04-16 RX ORDER — HYDRALAZINE HYDROCHLORIDE 20 MG/ML
5 INJECTION INTRAMUSCULAR; INTRAVENOUS
Status: COMPLETED | OUTPATIENT
Start: 2024-04-16 | End: 2024-04-16

## 2024-04-16 RX ADMIN — MORPHINE SULFATE 4 MG: 4 INJECTION, SOLUTION INTRAMUSCULAR; INTRAVENOUS at 09:04

## 2024-04-16 RX ADMIN — HYDRALAZINE HYDROCHLORIDE 10 MG: 20 INJECTION INTRAMUSCULAR; INTRAVENOUS at 10:04

## 2024-04-16 RX ADMIN — HYDRALAZINE HYDROCHLORIDE 5 MG: 20 INJECTION INTRAMUSCULAR; INTRAVENOUS at 08:04

## 2024-04-16 RX ADMIN — POTASSIUM CHLORIDE 20 MEQ: 1500 TABLET, EXTENDED RELEASE ORAL at 09:04

## 2024-04-16 RX ADMIN — CLONIDINE HYDROCHLORIDE 0.1 MG: 0.1 TABLET ORAL at 09:04

## 2024-04-16 RX ADMIN — ONDANSETRON 4 MG: 2 INJECTION INTRAMUSCULAR; INTRAVENOUS at 09:04

## 2024-04-16 RX ADMIN — HUMAN INSULIN 3 UNITS: 100 INJECTION, SOLUTION SUBCUTANEOUS at 09:04

## 2024-04-17 NOTE — ED PROVIDER NOTES
Encounter Date: 2024       History     Chief Complaint   Patient presents with    Headache     33 yo AAF to ED via WC with c/o headache since 1500. 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 Headache is retro-orbital. Denies any new neuro deficits. States blood pressure has been well controlled since her CVA. Reports history of migraines but do not usually present as this.     The history is provided by the patient and the spouse.     Review of patient's allergies indicates:   Allergen Reactions    Iodopropynyl butylcarbamate Dermatitis    Ciprofloxacin Hives and Rash    Latex Hives and Rash     Past Medical History:   Diagnosis Date    Mild hyperemesis gravidarum 10/01/2022     Past Surgical History:   Procedure Laterality Date    APPENDECTOMY       SECTION  2015    CHOLECYSTECTOMY  2016    TONSILLECTOMY       Family History   Problem Relation Name Age of Onset    No Known Problems Mother      No Known Problems Father      No Known Problems Sister      No Known Problems Brother       Social History     Tobacco Use    Smoking status: Never    Smokeless tobacco: Never   Substance Use Topics    Alcohol use: Not Currently    Drug use: Never     Review of Systems   Constitutional:  Negative for chills and fever.   Eyes:  Positive for photophobia, pain and visual disturbance (double vision since CVA).   Respiratory:  Negative for cough, shortness of breath and wheezing.    Cardiovascular:  Negative for chest pain, palpitations and leg swelling.   Gastrointestinal:  Negative for abdominal pain, nausea and vomiting.   Musculoskeletal:  Negative for neck pain and neck stiffness.   Neurological:  Positive for weakness (left sided weakness since CVA) and headaches. Negative for dizziness and light-headedness.   Psychiatric/Behavioral:  Negative for confusion.    All other systems reviewed and are negative.      Physical Exam     Initial Vitals [24]   BP  Pulse Resp Temp SpO2   (!) 176/109 79 18 98.8 °F (37.1 °C) 98 %      MAP       --         Physical Exam    Nursing note and vitals reviewed.  Constitutional: She appears well-developed and well-nourished.   HENT:   Head: Normocephalic.   Right Ear: External ear normal.   Left Ear: External ear normal.   Nose: Nose normal.   Mouth/Throat: Oropharynx is clear and moist.   Eyes: EOM are normal. Pupils are equal, round, and reactive to light.   Neck: Neck supple.   Normal range of motion.  Cardiovascular:  Normal rate, regular rhythm and normal heart sounds.           Pulmonary/Chest: Breath sounds normal.   Abdominal: Abdomen is soft.   Musculoskeletal:      Cervical back: Normal range of motion and neck supple.      Comments: Left sided weakness s/p CVA     Neurological: She is alert and oriented to person, place, and time. GCS score is 15. GCS eye subscore is 4. GCS verbal subscore is 5. GCS motor subscore is 6.   Skin: Skin is warm. Capillary refill takes 2 to 3 seconds.   Psychiatric: She has a normal mood and affect. Her behavior is normal. Judgment and thought content normal.         Medical Screening Exam   See Full Note    ED Course   Procedures  Labs Reviewed   COMPREHENSIVE METABOLIC PANEL - Abnormal; Notable for the following components:       Result Value    Potassium 3.1 (*)     Glucose 196 (*)     Albumin 3.3 (*)     Globulin 4.1 (*)     Alk Phos 135 (*)     All other components within normal limits   CBC WITH DIFFERENTIAL - Abnormal; Notable for the following components:    RBC 3.97 (*)     Hemoglobin 10.9 (*)     Hematocrit 35.0 (*)     MCHC 31.1 (*)     RDW 15.3 (*)     Platelet Count 453 (*)     MPV 8.3 (*)     Neutrophils % 51.9 (*)     Monocytes % 6.9 (*)     All other components within normal limits   CBC W/ AUTO DIFFERENTIAL    Narrative:     The following orders were created for panel order CBC auto differential.  Procedure                               Abnormality         Status                      ---------                               -----------         ------                     CBC with Differential[[id number]]       Abnormal            Final result                 Please view results for these tests on the individual orders.   URINALYSIS, REFLEX TO URINE CULTURE   POCT GLUCOSE MONITORING CONTINUOUS          Imaging Results              CT Head Without Contrast (Final result)  Result time 04/16/24 21:17:35      Final result by Cody Jain MD (04/16/24 21:17:35)                   Impression:      1. No acute intracranial abnormality.    2.  Other incidental findings as above.    Place of service: [facility]      Electronically signed by: Cody Jain  Date:    04/16/2024  Time:    21:17               Narrative:    EXAMINATION:  CT HEAD WITHOUT CONTRAST    CLINICAL HISTORY:  Stroke, follow up;headache;    TECHNIQUE:  Axial CT imaging from the vertex to skull the skull base was performed without contrast. Total DLP: 793 mGy*cm    Dose reduction:    The CT exam was performed using one or more dose reduction techniques: Automatic exposure control, automated adjustment of the MA and/or kVP according to patient size, or use of iterative reconstruction technique.    COMPARISON:  CT head 12/24/2013.    FINDINGS:  Cortical sulci, ventricles and basilar cisterns are within normal limits in appearance. There is no evidence of hydrocephalus, midline shift or mass effect. Gray and white matter differentiation is preserved. There is no CT evidence of acute intracranial hemorrhage or infarction.  Nonspecific areas of cortical hypodensity suggested at the cerebellum may correspond to remote infarcts and encephalomalacia.  Correlate with clinical history.  No recent prior imaging available for comparison.    The calvarium is intact.  Right frontal thom hole postsurgical changes are suggested.  Correlate with neuro surgical history.  The visualized orbits and globes appear within normal limits. The  paranasal sinuses and mastoid air cells are predominantly clear. Scalp soft tissues appear unremarkable.                                       Medications   hydrALAZINE injection 5 mg (5 mg Intravenous Given 4/16/24 2058)   potassium chloride SA CR tablet 20 mEq (20 mEq Oral Given 4/16/24 2151)   cloNIDine tablet 0.1 mg (0.1 mg Oral Given 4/16/24 2151)   morphine injection 4 mg (4 mg Intravenous Given 4/16/24 2150)   ondansetron injection 4 mg (4 mg Intravenous Given 4/16/24 2150)   insulin regular injection 3 Units 0.03 mL (3 Units Subcutaneous Given 4/16/24 2158)   hydrALAZINE injection 10 mg (10 mg Intravenous Given 4/16/24 2232)     Medical Decision Making  35 yo AAF to ED via WC with c/o headache since 1500. 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 Headache is retro-orbital. Denies any new neuro deficits. States blood pressure has been well controlled since her CVA. Reports history of migraines but do not usually present as this.     Vitals reviewed  Labs reviewed  CTH with no acute findings, chronic infarct noted.   BP improved, headache improved prior to discharge. Patient to monitor BP at home and follow up with PCP on Thursday.   Strict return and follow-up precautions have been given by me personally to the patient/family/caregiver(s).    Data Reviewed/Counseling: I have reviewed the patient's vital signs, nursing notes, and other relevant tests/information. I had a detailed discussion regarding the historical points, exam findings, and any diagnostic results supporting the discharge diagnosis. I also discussed the need for outpatient follow-up and the need to return to the ED if symptoms worsen or if there are any questions or concerns that arise at home.      Following the use of narcotic pain medications during their stay in the Emergency Department, the patient was safely discharged with a responsible care giver. Instructions were given to the patient regarding  the dangers of driving or operating machinery following the use of narcotics. The patient and care giver voiced understanding of these instructions.              Amount and/or Complexity of Data Reviewed  External Data Reviewed: radiology.  Labs: ordered. Decision-making details documented in ED Course.  Radiology: ordered. Decision-making details documented in ED Course.    Risk  Prescription drug management.  Parenteral controlled substances.  Drug therapy requiring intensive monitoring for toxicity.               ED Course as of 04/16/24 2246 Tue Apr 16, 2024 2145 Potassium(!): 3.1 [MJ]   2145 Glucose(!): 196 [MJ]      ED Course User Index  [MJ] Alejandra Sosa FNP                           Clinical Impression:   Final diagnoses:  [R51.9] Acute nonintractable headache, unspecified headache type (Primary)  [I10] Primary hypertension  [E87.6] Hypokalemia               Alejandra Sosa FNP  04/16/24 224

## 2024-04-17 NOTE — DISCHARGE INSTRUCTIONS
Monitor blood pressure at home. Take prescribed medication if bottom number is over 100 as discussed. Return to the ER for worsening condition. Follow up with your primary care provider this week.     The examination and treatment you have received in the Emergency Department today have been rendered on an emergency basis only and are not intended to be a substitute for an effort to provide complete medical care. You should contact your follow-up physician as it is important that you let him or her check you and report any new or remaining problems since it is impossible to recognize and treat all elements of an injury or illness in a single emergency care center visit.

## 2024-04-17 NOTE — ED TRIAGE NOTES
Presents to ER with c/o HA since approximately 1500 today. Pt states that pain is mostly behind her Left eye. States she has a hx of migraines but this pain feels different to her. Rates pain 7/10.

## 2024-04-17 NOTE — ED NOTES
Patient discharged to home via private vehicle with spouse, discharge instructions given with voiced understanding. PEPE

## 2024-05-04 ENCOUNTER — HOSPITAL ENCOUNTER (EMERGENCY)
Facility: HOSPITAL | Age: 34
Discharge: HOME | End: 2024-05-04
Payer: COMMERCIAL

## 2024-05-04 VITALS
WEIGHT: 208 LBS | RESPIRATION RATE: 16 BRPM | DIASTOLIC BLOOD PRESSURE: 98 MMHG | BODY MASS INDEX: 33.43 KG/M2 | SYSTOLIC BLOOD PRESSURE: 168 MMHG | HEIGHT: 66 IN | OXYGEN SATURATION: 97 % | HEART RATE: 71 BPM | TEMPERATURE: 98 F

## 2024-05-04 DIAGNOSIS — S70.02XA CONTUSION OF LEFT HIP, INITIAL ENCOUNTER: Primary | ICD-10-CM
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
## 2024-05-04 PROCEDURE — 99284 EMERGENCY DEPT VISIT MOD MDM: CPT | Mod: 25

## 2024-05-04 PROCEDURE — 63600175 PHARM REV CODE 636 W HCPCS: Performed by: NURSE PRACTITIONER

## 2024-05-04 PROCEDURE — 99283 EMERGENCY DEPT VISIT LOW MDM: CPT | Mod: ,,, | Performed by: NURSE PRACTITIONER

## 2024-05-04 PROCEDURE — 96372 THER/PROPH/DIAG INJ SC/IM: CPT | Performed by: NURSE PRACTITIONER

## 2024-05-04 RX ORDER — KETOROLAC TROMETHAMINE 30 MG/ML
30 INJECTION, SOLUTION INTRAMUSCULAR; INTRAVENOUS
Status: DISCONTINUED | OUTPATIENT
Start: 2024-05-04 | End: 2024-05-04

## 2024-05-04 RX ORDER — KETOROLAC TROMETHAMINE 30 MG/ML
30 INJECTION, SOLUTION INTRAMUSCULAR; INTRAVENOUS
Status: COMPLETED | OUTPATIENT
Start: 2024-05-04 | End: 2024-05-04

## 2024-05-04 RX ORDER — NAPROXEN 250 MG/1
250 TABLET ORAL
Qty: 30 TABLET | Refills: 0 | Status: SHIPPED | OUTPATIENT
Start: 2024-05-04 | End: 2024-05-27 | Stop reason: ALTCHOICE

## 2024-05-04 RX ADMIN — KETOROLAC TROMETHAMINE 30 MG: 30 INJECTION, SOLUTION INTRAMUSCULAR at 11:05

## 2024-05-05 NOTE — ED PROVIDER NOTES
Encounter Date: 2024       History     Chief Complaint   Patient presents with    Fall     Lt hip pain from fall 10 mins pta.  Pt weak onleft side from cva in January.  Pt was using bathroom and fell.  Denies pain anywhere else.  Pt has leg bent. No rotation noted.  Pt able to bear weight with assistance.      35 y/o AAF with PMHx of CVA and HTN presents with left hip pain after falling 10 minutes PTA to ED. Patient has residual left-side weakness after having a stroke 4 months ago. States she fell while up to the bathroom. She states she tripped on a cord causing her to fall. She denies striking her head.        Review of patient's allergies indicates:   Allergen Reactions    Iodopropynyl butylcarbamate Dermatitis    Ciprofloxacin Hives and Rash    Latex Hives and Rash     Past Medical History:   Diagnosis Date    Mild hyperemesis gravidarum 10/01/2022     Past Surgical History:   Procedure Laterality Date    APPENDECTOMY       SECTION  2015    CHOLECYSTECTOMY  2016    TONSILLECTOMY  1999     Family History   Problem Relation Name Age of Onset    No Known Problems Mother      No Known Problems Father      No Known Problems Sister      No Known Problems Brother       Social History     Tobacco Use    Smoking status: Never    Smokeless tobacco: Never   Substance Use Topics    Alcohol use: Not Currently    Drug use: Never     Review of Systems   Respiratory: Negative.  Negative for apnea, cough, choking, chest tightness, shortness of breath, wheezing and stridor.    Cardiovascular:  Negative for chest pain, palpitations and leg swelling.   Gastrointestinal: Negative.    Genitourinary: Negative.    Musculoskeletal:  Positive for arthralgias.        Left hip pain   Skin: Negative.    Neurological: Negative.    Psychiatric/Behavioral: Negative.     All other systems reviewed and are negative.      Physical Exam     Initial Vitals [24 2220]   BP Pulse Resp Temp SpO2   (!) 168/98 71 16 98.1 °F (36.7  °C) 97 %      MAP       --         Physical Exam    Nursing note and vitals reviewed.  HENT:   Head: Normocephalic and atraumatic.   Eyes: Conjunctivae and EOM are normal. Pupils are equal, round, and reactive to light.   Neck: Neck supple.   Normal range of motion.  Cardiovascular:  Normal rate, regular rhythm, normal heart sounds and intact distal pulses.     Exam reveals no gallop and no friction rub.       No murmur heard.  Pulmonary/Chest: Breath sounds normal. No respiratory distress. She has no wheezes. She has no rhonchi. She has no rales. She exhibits no tenderness.   Musculoskeletal:      Cervical back: Normal range of motion and neck supple.      Left hip: Tenderness present. No deformity, lacerations, bony tenderness or crepitus. Normal range of motion. Normal strength.     Neurological: She is alert and oriented to person, place, and time. She has normal strength. No cranial nerve deficit or sensory deficit.   Skin: Skin is warm and dry. Capillary refill takes less than 2 seconds.   Psychiatric: She has a normal mood and affect. Her behavior is normal. Judgment and thought content normal.         Medical Screening Exam   See Full Note    ED Course   Procedures  Labs Reviewed - No data to display       Imaging Results              X-Ray Hip 2 or 3 views Left (with Pelvis when performed) (In process)                      Medications   ketorolac injection 30 mg (30 mg Intramuscular Given 5/4/24 2303)     Medical Decision Making  35 y/o AAF with PMHx of CVA and HTN presents with left hip pain after falling 10 minutes PTA to ED. Patient has residual left-side weakness after having a stroke 4 months ago. States she fell while up to the bathroom. She states she tripped on a cord causing her to fall. She denies striking her head.    Amount and/or Complexity of Data Reviewed  Radiology: ordered.                                      Clinical Impression:   Final diagnoses:  [W19.XXXA] Fall  [S70.02XA] Contusion of  left hip, initial encounter (Primary)        ED Disposition Condition    Discharge Stable          ED Prescriptions       Medication Sig Dispense Start Date End Date Auth. Provider    naproxen (NAPROSYN) 250 MG tablet Take 1 tablet (250 mg total) by mouth every meal as needed (hip pain). 30 tablet 5/4/2024 -- Shahram Bach FNP          Follow-up Information       Follow up With Specialties Details Why Contact Info    Miguel Kelly, NP Internal Medicine Go to  As needed, for follow up [address] [place] [address] [phone]               Shahram Bach FNP  05/04/24 2540

## 2024-05-13 ENCOUNTER — HOSPITAL ENCOUNTER (EMERGENCY)
Facility: HOSPITAL | Age: 34
Discharge: HOME | End: 2024-05-13
Payer: COMMERCIAL

## 2024-05-13 VITALS
BODY MASS INDEX: 33.57 KG/M2 | DIASTOLIC BLOOD PRESSURE: 93 MMHG | TEMPERATURE: 98 F | OXYGEN SATURATION: 98 % | SYSTOLIC BLOOD PRESSURE: 145 MMHG | HEART RATE: 65 BPM | HEIGHT: 66 IN | RESPIRATION RATE: 15 BRPM

## 2024-05-13 DIAGNOSIS — M25.559 HIP PAIN: ICD-10-CM

## 2024-05-13 DIAGNOSIS — R56.9 SEIZURE: Primary | ICD-10-CM

## 2024-05-13 LAB
ALBUMIN SERPL BCP-MCNC: 2.9 G/DL (ref 3.5–5)
ALBUMIN/GLOB SERPL: 0.7 {RATIO}
ALP SERPL-CCNC: 125 U/L (ref 37–98)
ALT SERPL W P-5'-P-CCNC: 19 U/L (ref 13–56)
AMPHET UR QL SCN: NEGATIVE
ANION GAP SERPL CALCULATED.3IONS-SCNC: 12 MMOL/L (ref 7–16)
AST SERPL W P-5'-P-CCNC: 17 U/L (ref 15–37)
BACTERIA #/AREA URNS HPF: ABNORMAL /HPF
BARBITURATES UR QL SCN: NEGATIVE
BASOPHILS # BLD AUTO: 0.02 K/UL (ref 0–0.2)
BASOPHILS NFR BLD AUTO: 0.2 % (ref 0–1)
BENZODIAZ METAB UR QL SCN: NEGATIVE
BILIRUB SERPL-MCNC: 0.1 MG/DL (ref ?–1.2)
BILIRUB UR QL STRIP: NEGATIVE
BUN SERPL-MCNC: 8 MG/DL (ref 7–18)
BUN/CREAT SERPL: 15 (ref 6–20)
CALCIUM SERPL-MCNC: 8.8 MG/DL (ref 8.5–10.1)
CHLORIDE SERPL-SCNC: 107 MMOL/L (ref 98–107)
CLARITY UR: CLEAR
CO2 SERPL-SCNC: 26 MMOL/L (ref 21–32)
COCAINE UR QL SCN: NEGATIVE
COLOR UR: YELLOW
CREAT SERPL-MCNC: 0.53 MG/DL (ref 0.55–1.02)
DIFFERENTIAL METHOD BLD: ABNORMAL
EGFR (NO RACE VARIABLE) (RUSH/TITUS): 126 ML/MIN/1.73M2
EOSINOPHIL # BLD AUTO: 0.32 K/UL (ref 0–0.5)
EOSINOPHIL NFR BLD AUTO: 3.1 % (ref 1–4)
ERYTHROCYTE [DISTWIDTH] IN BLOOD BY AUTOMATED COUNT: 14.3 % (ref 11.5–14.5)
GLOBULIN SER-MCNC: 4 G/DL (ref 2–4)
GLUCOSE SERPL-MCNC: 128 MG/DL (ref 74–106)
GLUCOSE UR STRIP-MCNC: >=1000 MG/DL
HCG UR QL IA.RAPID: NEGATIVE
HCT VFR BLD AUTO: 36.6 % (ref 38–47)
HGB BLD-MCNC: 11.3 G/DL (ref 12–16)
KETONES UR STRIP-SCNC: ABNORMAL MG/DL
LEUKOCYTE ESTERASE UR QL STRIP: NEGATIVE
LYMPHOCYTES # BLD AUTO: 2.23 K/UL (ref 1–4.8)
LYMPHOCYTES NFR BLD AUTO: 21.3 % (ref 27–41)
MCH RBC QN AUTO: 27.1 PG (ref 27–31)
MCHC RBC AUTO-ENTMCNC: 30.9 G/DL (ref 32–36)
MCV RBC AUTO: 87.8 FL (ref 80–96)
MDA UR QL SCN: NEGATIVE
METHADONE UR QL SCN: NEGATIVE
METHAMPHET UR QL SCN: NEGATIVE
MONOCYTES # BLD AUTO: 0.76 K/UL (ref 0–0.8)
MONOCYTES NFR BLD AUTO: 7.3 % (ref 2–6)
MPC BLD CALC-MCNC: 8.9 FL (ref 9.4–12.4)
NEUTROPHILS # BLD AUTO: 7.15 K/UL (ref 1.8–7.7)
NEUTROPHILS NFR BLD AUTO: 68.1 % (ref 53–65)
NITRITE UR QL STRIP: NEGATIVE
OPIATES UR QL SCN: NEGATIVE
OXYCODONE UR QL SCN: NEGATIVE
PCP UR QL SCN: NEGATIVE
PH UR STRIP: 6 PH UNITS
PLATELET # BLD AUTO: 362 K/UL (ref 150–400)
POTASSIUM SERPL-SCNC: 3.9 MMOL/L (ref 3.5–5.1)
PROT SERPL-MCNC: 6.9 G/DL (ref 6.4–8.2)
PROT UR QL STRIP: NEGATIVE
RBC # BLD AUTO: 4.17 M/UL (ref 4.2–5.4)
RBC # UR STRIP: ABNORMAL /UL
RBC #/AREA URNS HPF: ABNORMAL /HPF
SODIUM SERPL-SCNC: 141 MMOL/L (ref 136–145)
SP GR UR STRIP: 1.01
SQUAMOUS #/AREA URNS LPF: ABNORMAL /LPF
THC UR QL SCN: NEGATIVE
TRICYCLICS UR QL SCN: NEGATIVE
UROBILINOGEN UR STRIP-ACNC: 0.2 MG/DL
WBC # BLD AUTO: 10.48 K/UL (ref 4.5–11)
WBC #/AREA URNS HPF: ABNORMAL /HPF

## 2024-05-13 PROCEDURE — 25000003 PHARM REV CODE 250: Performed by: NURSE PRACTITIONER

## 2024-05-13 PROCEDURE — 80305 DRUG TEST PRSMV DIR OPT OBS: CPT | Performed by: NURSE PRACTITIONER

## 2024-05-13 PROCEDURE — 80053 COMPREHEN METABOLIC PANEL: CPT | Performed by: NURSE PRACTITIONER

## 2024-05-13 PROCEDURE — 81025 URINE PREGNANCY TEST: CPT | Performed by: NURSE PRACTITIONER

## 2024-05-13 PROCEDURE — 85025 COMPLETE CBC W/AUTO DIFF WBC: CPT | Performed by: NURSE PRACTITIONER

## 2024-05-13 PROCEDURE — 81001 URINALYSIS AUTO W/SCOPE: CPT | Mod: 59 | Performed by: NURSE PRACTITIONER

## 2024-05-13 PROCEDURE — 96374 THER/PROPH/DIAG INJ IV PUSH: CPT

## 2024-05-13 PROCEDURE — 80177 DRUG SCRN QUAN LEVETIRACETAM: CPT | Performed by: NURSE PRACTITIONER

## 2024-05-13 PROCEDURE — 63600175 PHARM REV CODE 636 W HCPCS: Performed by: NURSE PRACTITIONER

## 2024-05-13 PROCEDURE — 99285 EMERGENCY DEPT VISIT HI MDM: CPT | Mod: ,,, | Performed by: NURSE PRACTITIONER

## 2024-05-13 PROCEDURE — 99285 EMERGENCY DEPT VISIT HI MDM: CPT | Mod: 25

## 2024-05-13 RX ORDER — LEVETIRACETAM 500 MG/5ML
1500 INJECTION, SOLUTION, CONCENTRATE INTRAVENOUS
Status: COMPLETED | OUTPATIENT
Start: 2024-05-13 | End: 2024-05-13

## 2024-05-13 RX ADMIN — SODIUM CHLORIDE 500 ML: 9 INJECTION, SOLUTION INTRAVENOUS at 02:05

## 2024-05-13 RX ADMIN — LEVETIRACETAM 1500 MG: 100 INJECTION, SOLUTION INTRAVENOUS at 02:05

## 2024-05-13 NOTE — DISCHARGE INSTRUCTIONS
Continue keppra as directed.  Follow up with your primary care provider in 2-3 days. And call neurology to see if they want to see due to break through seizure  Return for concerning symptoms or emergency needs.

## 2024-05-13 NOTE — ED TRIAGE NOTES
PT presents to the ED via EMS w/ c/o seizure while at therapy 30 minutes ago, pt states that she was not feeling well this morning before therapy and feeling off balance and having blurry vision.

## 2024-05-13 NOTE — ED PROVIDER NOTES
"Encounter Date: 2024       History     Chief Complaint   Patient presents with    Seizures     34 yr old AAF with PMH of HTN, seizures, CVA in  with residual left side weakness was at PT in [place] on a stretch bed having therapy and she has a seizure.  Pt reports this morning was feeling "bad", felt a little off balance.  She complains of left hip pain now.  Reports hx of left shoulder since CVA since January.      The history is provided by the patient, the EMS personnel and a relative.   Seizures   This is a recurrent problem. The current episode started just prior to arrival. The problem has been gradually improving. The most recent episode lasted 30 to 120 seconds. Characteristics include rhythmic jerking. The seizure(s) had no focality. There were no medications administered prior to arrival.     Review of patient's allergies indicates:   Allergen Reactions    Iodopropynyl butylcarbamate Dermatitis    Ciprofloxacin Hives and Rash    Latex Hives and Rash     Past Medical History:   Diagnosis Date    CVA (cerebral vascular accident) 2024    Mild hyperemesis gravidarum 10/01/2022     Past Surgical History:   Procedure Laterality Date    APPENDECTOMY       SECTION  2015    CHOLECYSTECTOMY  2016    TONSILLECTOMY  1999     Family History   Problem Relation Name Age of Onset    No Known Problems Mother      No Known Problems Father      No Known Problems Sister      No Known Problems Brother       Social History     Tobacco Use    Smoking status: Never    Smokeless tobacco: Never   Substance Use Topics    Alcohol use: Not Currently    Drug use: Never     Review of Systems   Constitutional: Negative.    Respiratory: Negative.     Cardiovascular: Negative.    Skin: Negative.    Neurological:  Positive for seizures.       Physical Exam     Initial Vitals   BP Pulse Resp Temp SpO2   24 1414 24 1414 24 1414 24 1547 24 1414   (!) 165/116 77 18 98.1 °F (36.7 °C) 98 %    "   MAP       --                Physical Exam    Nursing note and vitals reviewed.  Constitutional: She is Obese . She is cooperative.   Cardiovascular:  Normal rate.           Pulmonary/Chest: Breath sounds normal.   Abdominal: Abdomen is soft.   Musculoskeletal:      Comments: Left side weakness     Neurological: She is alert and oriented to person, place, and time.   Skin: Skin is warm and dry.         Medical Screening Exam   See Full Note    ED Course   Procedures  Labs Reviewed   URINALYSIS - Abnormal; Notable for the following components:       Result Value    Glucose, UA >=1000 (*)     Blood, UA Large (*)     All other components within normal limits   COMPREHENSIVE METABOLIC PANEL - Abnormal; Notable for the following components:    Glucose 128 (*)     Creatinine 0.53 (*)     Albumin 2.9 (*)     Alk Phos 125 (*)     All other components within normal limits   CBC WITH DIFFERENTIAL - Abnormal; Notable for the following components:    RBC 4.17 (*)     Hemoglobin 11.3 (*)     Hematocrit 36.6 (*)     MCHC 30.9 (*)     MPV 8.9 (*)     Neutrophils % 68.1 (*)     Lymphocytes % 21.3 (*)     Monocytes % 7.3 (*)     All other components within normal limits   URINALYSIS, MICROSCOPIC - Abnormal; Notable for the following components:    RBC, UA 5-10 (*)     All other components within normal limits   HCG QUALITATIVE URINE - Normal   DRUG SCREEN, URINE (BEAKER) - Normal   CBC W/ AUTO DIFFERENTIAL    Narrative:     The following orders were created for panel order CBC Auto Differential.  Procedure                               Abnormality         Status                     ---------                               -----------         ------                     CBC with Differential[[id number]]       Abnormal            Final result                 Please view results for these tests on the individual orders.   LEVETIRACETAM  (KEPPRA) LEVEL          Imaging Results              CT Head Without Contrast (Final result)  Result  time 05/13/24 14:47:16      Final result by Allen Sutherland DO (05/13/24 14:47:16)                   Impression:      No convincing evidence of acute intracranial hemorrhage.  Old left occipital and bilateral cerebellar infarcts. 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    The CT exam was performed using one or more of the following dose    reduction techniques- Automated exposure control, adjustment of the mA    and/or kV according to patient size, and/or use of iterative    reconstructed technique.    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    FINDINGS:  Midline structures are nondisplaced.  No convincing evidence of acute intracranial hemorrhage.  No convincing evidence of hydrocephalus.  Old left occipital and bilateral cerebellar infarcts.  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  Visualized paranasal sinuses and mastoid air cells are predominantly clear.                                       X-Ray Hip 2 or 3 views Left (with Pelvis when performed) (Final result)  Result time 05/13/24 14:50:37      Final result by Allen Sutherland DO (05/13/24 14:50:37)                   Impression:      As above.    Point of Service: [facility]      Electronically signed by: Allen Sutherland  Date:    05/13/2024  Time:    14:50               Narrative:    EXAMINATION:  XR HIP WITH PELVIS WHEN PERFORMED, 2 OR 3 VIEWS LEFT    CLINICAL HISTORY:  Pain in unspecified hip    COMPARISON:  Pelvis/hip x-ray May 4 2024    TECHNIQUE:  Single frontal view of the pelvis as well as frontal and frogleg lateral views  "of the left hip.    FINDINGS:  No convincing acute fracture or dislocation demonstrated. No concerning radiopaque foreign body visualized.                                       Medications   sodium chloride 0.9% bolus 500 mL 500 mL (500 mLs Intravenous New Bag 5/13/24 1453)   levETIRAcetam injection 1,500 mg (1,500 mg Intravenous Given 5/13/24 1451)     Medical Decision Making  34 yr old AAF with PMH of HTN, seizures, CVA in Jan with residual left side weakness was at PT in [place] on a stretch bed having therapy and she has a seizure.  Pt reports this morning was feeling "bad", felt a little off balance.  She complains of left hip pain now.  Reports hx of left shoulder since CVA since January.        Amount and/or Complexity of Data Reviewed  Labs: ordered. Decision-making details documented in ED Course.  Radiology: ordered.     Details: No acute findings  Discussion of management or test interpretation with external provider(s): Telemed consult via audio/ video with Dr. Ring who agrees ok to DC and f/u with PCP.      Risk  Prescription drug management.               ED Course as of 05/13/24 1559   Mon May 13, 2024   1544 Blood, UA(!): Large  Is on menstrual cycle now. [CG]   1548 Telemed consult with Dr. Ring [CG]      ED Course User Index  [CG] Nancy Clark FNP                           Clinical Impression:   Final diagnoses:  [M25.559] Hip pain  [R56.9] Seizure (Primary)        ED Disposition Condition    Discharge Stable          ED Prescriptions    None       Follow-up Information       Follow up With Specialties Details Why Contact Info    Miguel Kelly NP Internal Medicine Go in 2 days  [address] [address] [phone]               Nancy Clark FNP  05/13/24 1559    "

## 2024-05-14 LAB — LEVETIRACETAM SERPL-MCNC: 166.9 ΜG/ML (ref 12–46)

## 2024-05-27 ENCOUNTER — HOSPITAL ENCOUNTER (EMERGENCY)
Facility: HOSPITAL | Age: 34
Discharge: HOME | End: 2024-05-27
Payer: COMMERCIAL

## 2024-05-27 VITALS
TEMPERATURE: 99 F | RESPIRATION RATE: 2 BRPM | DIASTOLIC BLOOD PRESSURE: 98 MMHG | HEART RATE: 74 BPM | SYSTOLIC BLOOD PRESSURE: 162 MMHG | OXYGEN SATURATION: 97 %

## 2024-05-27 DIAGNOSIS — R56.9 SEIZURE: ICD-10-CM

## 2024-05-27 LAB
ALBUMIN SERPL BCP-MCNC: 2.9 G/DL (ref 3.5–5)
ALBUMIN/GLOB SERPL: 0.6 {RATIO}
ALP SERPL-CCNC: 108 U/L (ref 37–98)
ALT SERPL W P-5'-P-CCNC: 16 U/L (ref 13–56)
ANION GAP SERPL CALCULATED.3IONS-SCNC: 13 MMOL/L (ref 7–16)
AST SERPL W P-5'-P-CCNC: 11 U/L (ref 15–37)
BASOPHILS # BLD AUTO: 0.02 K/UL (ref 0–0.2)
BASOPHILS NFR BLD AUTO: 0.3 % (ref 0–1)
BILIRUB SERPL-MCNC: 0.3 MG/DL (ref ?–1.2)
BILIRUB UR QL STRIP: NEGATIVE
BUN SERPL-MCNC: 17 MG/DL (ref 7–18)
BUN/CREAT SERPL: 23 (ref 6–20)
CALCIUM SERPL-MCNC: 8.8 MG/DL (ref 8.5–10.1)
CHLORIDE SERPL-SCNC: 107 MMOL/L (ref 98–107)
CLARITY UR: CLEAR
CO2 SERPL-SCNC: 25 MMOL/L (ref 21–32)
COLOR UR: YELLOW
CREAT SERPL-MCNC: 0.74 MG/DL (ref 0.55–1.02)
DIFFERENTIAL METHOD BLD: ABNORMAL
EGFR (NO RACE VARIABLE) (RUSH/TITUS): 110 ML/MIN/1.73M2
EOSINOPHIL # BLD AUTO: 0.13 K/UL (ref 0–0.5)
EOSINOPHIL NFR BLD AUTO: 1.8 % (ref 1–4)
ERYTHROCYTE [DISTWIDTH] IN BLOOD BY AUTOMATED COUNT: 14.4 % (ref 11.5–14.5)
GLOBULIN SER-MCNC: 4.6 G/DL (ref 2–4)
GLUCOSE SERPL-MCNC: 192 MG/DL (ref 70–105)
GLUCOSE SERPL-MCNC: 199 MG/DL (ref 74–106)
GLUCOSE SERPL-MCNC: 225 MG/DL (ref 70–105)
GLUCOSE UR STRIP-MCNC: 500 MG/DL
HCG UR QL IA.RAPID: NEGATIVE
HCT VFR BLD AUTO: 35.8 % (ref 38–47)
HGB BLD-MCNC: 11.1 G/DL (ref 12–16)
INFLUENZA A MOLECULAR (OHS): NEGATIVE
INFLUENZA B MOLECULAR (OHS): NEGATIVE
KETONES UR STRIP-SCNC: NEGATIVE MG/DL
LACTATE SERPL-SCNC: 1.1 MMOL/L (ref 0.4–2)
LEUKOCYTE ESTERASE UR QL STRIP: NEGATIVE
LYMPHOCYTES # BLD AUTO: 2.62 K/UL (ref 1–4.8)
LYMPHOCYTES NFR BLD AUTO: 35.5 % (ref 27–41)
MCH RBC QN AUTO: 26.3 PG (ref 27–31)
MCHC RBC AUTO-ENTMCNC: 31 G/DL (ref 32–36)
MCV RBC AUTO: 84.8 FL (ref 80–96)
MONOCYTES # BLD AUTO: 0.47 K/UL (ref 0–0.8)
MONOCYTES NFR BLD AUTO: 6.4 % (ref 2–6)
MPC BLD CALC-MCNC: 8 FL (ref 9.4–12.4)
NEUTROPHILS # BLD AUTO: 4.14 K/UL (ref 1.8–7.7)
NEUTROPHILS NFR BLD AUTO: 56 % (ref 53–65)
NITRITE UR QL STRIP: NEGATIVE
PH UR STRIP: 6 PH UNITS
PLATELET # BLD AUTO: 386 K/UL (ref 150–400)
POTASSIUM SERPL-SCNC: 4.3 MMOL/L (ref 3.5–5.1)
PROT SERPL-MCNC: 7.5 G/DL (ref 6.4–8.2)
PROT UR QL STRIP: NEGATIVE
RBC # BLD AUTO: 4.22 M/UL (ref 4.2–5.4)
RBC # UR STRIP: NEGATIVE /UL
SARS-COV-2 RDRP RESP QL NAA+PROBE: NEGATIVE
SODIUM SERPL-SCNC: 141 MMOL/L (ref 136–145)
SP GR UR STRIP: 1.02
UROBILINOGEN UR STRIP-ACNC: 0.2 MG/DL
WBC # BLD AUTO: 7.38 K/UL (ref 4.5–11)

## 2024-05-27 PROCEDURE — 93010 ELECTROCARDIOGRAM REPORT: CPT | Mod: ,,, | Performed by: INTERNAL MEDICINE

## 2024-05-27 PROCEDURE — 82962 GLUCOSE BLOOD TEST: CPT

## 2024-05-27 PROCEDURE — 25000003 PHARM REV CODE 250: Performed by: NURSE PRACTITIONER

## 2024-05-27 PROCEDURE — 96361 HYDRATE IV INFUSION ADD-ON: CPT

## 2024-05-27 PROCEDURE — 96374 THER/PROPH/DIAG INJ IV PUSH: CPT

## 2024-05-27 PROCEDURE — 87635 SARS-COV-2 COVID-19 AMP PRB: CPT | Performed by: NURSE PRACTITIONER

## 2024-05-27 PROCEDURE — 83605 ASSAY OF LACTIC ACID: CPT | Performed by: NURSE PRACTITIONER

## 2024-05-27 PROCEDURE — 80177 DRUG SCRN QUAN LEVETIRACETAM: CPT | Performed by: NURSE PRACTITIONER

## 2024-05-27 PROCEDURE — 81003 URINALYSIS AUTO W/O SCOPE: CPT | Performed by: NURSE PRACTITIONER

## 2024-05-27 PROCEDURE — 99285 EMERGENCY DEPT VISIT HI MDM: CPT | Mod: 25

## 2024-05-27 PROCEDURE — 85025 COMPLETE CBC W/AUTO DIFF WBC: CPT | Performed by: NURSE PRACTITIONER

## 2024-05-27 PROCEDURE — 99285 EMERGENCY DEPT VISIT HI MDM: CPT | Mod: ,,, | Performed by: NURSE PRACTITIONER

## 2024-05-27 PROCEDURE — 93005 ELECTROCARDIOGRAM TRACING: CPT

## 2024-05-27 PROCEDURE — 80053 COMPREHEN METABOLIC PANEL: CPT | Performed by: NURSE PRACTITIONER

## 2024-05-27 PROCEDURE — 63600175 PHARM REV CODE 636 W HCPCS: Performed by: NURSE PRACTITIONER

## 2024-05-27 PROCEDURE — 87502 INFLUENZA DNA AMP PROBE: CPT | Performed by: NURSE PRACTITIONER

## 2024-05-27 PROCEDURE — 81025 URINE PREGNANCY TEST: CPT | Performed by: NURSE PRACTITIONER

## 2024-05-27 RX ORDER — SULFAMETHOXAZOLE AND TRIMETHOPRIM 800; 160 MG/1; MG/1
1 TABLET ORAL 2 TIMES DAILY
COMMUNITY
Start: 2024-05-24

## 2024-05-27 RX ORDER — ATORVASTATIN CALCIUM 80 MG/1
80 TABLET, FILM COATED ORAL
COMMUNITY
Start: 2024-03-23

## 2024-05-27 RX ORDER — GABAPENTIN 100 MG/1
100 CAPSULE ORAL 3 TIMES DAILY
COMMUNITY
Start: 2024-03-28

## 2024-05-27 RX ORDER — SERTRALINE HYDROCHLORIDE 50 MG/1
50 TABLET, FILM COATED ORAL NIGHTLY
COMMUNITY
Start: 2024-02-29

## 2024-05-27 RX ORDER — TIZANIDINE 2 MG/1
TABLET ORAL
COMMUNITY
Start: 2024-05-07

## 2024-05-27 RX ORDER — MIDAZOLAM 5 MG/.1ML
SPRAY NASAL
Qty: 2 EACH | Refills: 0 | Status: SHIPPED | OUTPATIENT
Start: 2024-05-27

## 2024-05-27 RX ORDER — LEVETIRACETAM 500 MG/5ML
1000 INJECTION, SOLUTION, CONCENTRATE INTRAVENOUS
Status: COMPLETED | OUTPATIENT
Start: 2024-05-27 | End: 2024-05-27

## 2024-05-27 RX ADMIN — SODIUM CHLORIDE 500 ML: 9 INJECTION, SOLUTION INTRAVENOUS at 12:05

## 2024-05-27 RX ADMIN — LEVETIRACETAM 1000 MG: 100 INJECTION, SOLUTION INTRAVENOUS at 12:05

## 2024-05-27 NOTE — ED TRIAGE NOTES
Pt arrived to ED after family states pt had 3 seizures today. Pt has not taken seizure meds this AM. Pt has known history of seizures.

## 2024-05-27 NOTE — ED PROVIDER NOTES
"Encounter Date: 2024       History     Chief Complaint   Patient presents with    Seizures     Has history of, had 3 today     35 yo AAF to ED via POV with mother. History of HTN, DM, seizures, CVA with residual left sided weakness.    Mother reports 3 seizures today, unable to advise how long each lasted but said "they weren't too bad". She did have urinary incontinence. Denies fall or trauma. Patient postictal on arrival but will wake to verbal stimuli. Mother states patient has been "feeling bad" all week.     The history is provided by a relative.     Review of patient's allergies indicates:   Allergen Reactions    Iodopropynyl butylcarbamate Dermatitis    Ciprofloxacin Hives and Rash    Latex Hives and Rash     Past Medical History:   Diagnosis Date    CVA (cerebral vascular accident) 2024    Mild hyperemesis gravidarum 10/01/2022     Past Surgical History:   Procedure Laterality Date    APPENDECTOMY       SECTION  2015    CHOLECYSTECTOMY  2016    TONSILLECTOMY  1999     Family History   Problem Relation Name Age of Onset    No Known Problems Mother      No Known Problems Father      No Known Problems Sister      No Known Problems Brother       Social History     Tobacco Use    Smoking status: Never    Smokeless tobacco: Never   Substance Use Topics    Alcohol use: Not Currently    Drug use: Never     Review of Systems    Physical Exam     Initial Vitals [24 1152]   BP Pulse Resp Temp SpO2   (!) 162/98 74 16 98.5 °F (36.9 °C) 97 %      MAP       --         Physical Exam    Medical Screening Exam   See Full Note    ED Course   Procedures  Labs Reviewed   COMPREHENSIVE METABOLIC PANEL - Abnormal; Notable for the following components:       Result Value    Glucose 199 (*)     BUN/Creatinine Ratio 23 (*)     Albumin 2.9 (*)     Globulin 4.6 (*)     Alk Phos 108 (*)     AST 11 (*)     All other components within normal limits   URINALYSIS, REFLEX TO URINE CULTURE - Abnormal; Notable for the " following components:    Glucose,  (*)     All other components within normal limits   CBC WITH DIFFERENTIAL - Abnormal; Notable for the following components:    Hemoglobin 11.1 (*)     Hematocrit 35.8 (*)     MCH 26.3 (*)     MCHC 31.0 (*)     MPV 8.0 (*)     Monocytes % 6.4 (*)     All other components within normal limits   POCT GLUCOSE MONITORING CONTINUOUS - Abnormal; Notable for the following components:    POC Glucose 192 (*)     All other components within normal limits   INFLUENZA A & B BY MOLECULAR - Normal   HCG QUALITATIVE URINE - Normal   LACTIC ACID, PLASMA - Normal   SARS-COV-2 RNA AMPLIFICATION, QUAL - Normal    Narrative:     Negative SARS-CoV results should not be used as the sole basis for treatment or patient management decisions; negative results should be considered in the context of a patient's recent exposures, history and the presene of clinical signs and symptoms consistent with COVID-19.  Negative results should be treated as presumptive and confirmed by molecular assay, if necessary for patient management.   CBC W/ AUTO DIFFERENTIAL    Narrative:     The following orders were created for panel order CBC auto differential.  Procedure                               Abnormality         Status                     ---------                               -----------         ------                     CBC with Differential[[id number]]       Abnormal            Final result                 Please view results for these tests on the individual orders.   LEVETIRACETAM  (KEPPRA) LEVEL   POCT GLUCOSE MONITORING CONTINUOUS   POCT GLUCOSE MONITORING CONTINUOUS     EKG Readings: (Independently Interpreted)   Initial Reading: No STEMI. Previous EKG Date: 8/10/2022. Rhythm: Normal Sinus Rhythm. Heart Rate: 69.   Flipped/Flat T waves       Imaging Results              X-Ray Chest AP Portable (Final result)  Result time 05/27/24 12:27:06      Final result by Magdiel Navarro MD (05/27/24 12:27:06)         "           Impression:      No acute findings.      Electronically signed by: Magdiel Navarro  Date:    05/27/2024  Time:    12:27               Narrative:    EXAMINATION:  XR CHEST AP PORTABLE    CLINICAL HISTORY:  Unspecified convulsions    TECHNIQUE:  Single frontal view of the chest was performed.    COMPARISON:  10/24/2015    FINDINGS:  Heart size normal. Lungs clear. No pneumothorax or pleural effusion.                                       Medications   levETIRAcetam injection 1,000 mg (1,000 mg Intravenous Given 5/27/24 1219)   sodium chloride 0.9% bolus 500 mL 500 mL (500 mLs Intravenous New Bag 5/27/24 1221)     Medical Decision Making  35 yo AAF to ED via POV with mother. History of HTN, DM, seizures, CVA with residual left sided weakness.    Mother reports 3 seizures today, unable to advise how long each lasted but said "they weren't too bad". She did have urinary incontinence. Denies fall or trauma. Patient postictal on arrival but will wake to verbal stimuli. Mother states patient has been "feeling bad" all week. Of note, patient was started on Bactrim on 5/24.     Mother does not have rescue medication. Will prescribe at discharge but advised that it may require PA so she needs to let neurologist know in case they need to provide clinic notes.     Vitals reviewed. Slightly hypertensive but has not taken BP medication this morning.   EKG NSR with flattened/flipped T waves  Labs reviewed: Glucose 199 - getting IV fluids. WBC normal. Lactate normal.   CXR with no infectious process    Patient returned to baseline, asked about her symptoms regarding "feeling bad all week" and she states her left foot and leg have been hurting which has been chronic for her. She takes Gabapentin 100 mg TID.     1g Keppra IV          Amount and/or Complexity of Data Reviewed  Independent Historian: parent  External Data Reviewed: labs and notes.  Labs: ordered. Decision-making details documented in ED Course.               ED " Course as of 05/27/24 1325   Mon May 27, 2024   1221 POC Glucose(!): 192 [MJ]   1257 X-Ray Chest AP Portable []      ED Course User Index  [] Alejandra Sosa FNP                           Clinical Impression:   Final diagnoses:  [R56.9] Seizure        ED Disposition Condition    Discharge Stable          ED Prescriptions       Medication Sig Dispense Start Date End Date Auth. Provider    midazolam (NAYZILAM) 5 mg/spray (0.1 mL) Spry Administer 1 dose nasally for seizure activity lasting 3 minutes or for back to back seizure activity. 2 each 5/27/2024 -- Alejandra Sosa FNP          Follow-up Information    None          Alejandra Sosa FNP  05/27/24 1326

## 2024-05-27 NOTE — DISCHARGE INSTRUCTIONS
Return to the ER for worsening symptoms. Follow up with primary care provider.     The examination and treatment you have received in the Emergency Department today have been rendered on an emergency basis only and are not intended to be a substitute for an effort to provide complete medical care. You should contact your follow-up physician as it is important that you let him or her check you and report any new or remaining problems since it is impossible to recognize and treat all elements of an injury or illness in a single emergency care center visit.

## 2024-05-28 LAB
OHS QRS DURATION: 82 MS
OHS QTC CALCULATION: 428 MS

## 2024-05-30 LAB — LEVETIRACETAM SERPL-MCNC: 12 ΜG/ML (ref 12–46)

## 2024-07-29 ENCOUNTER — HOSPITAL ENCOUNTER (EMERGENCY)
Facility: HOSPITAL | Age: 34
Discharge: HOME | End: 2024-07-29
Payer: COMMERCIAL

## 2024-07-29 VITALS
HEIGHT: 66 IN | HEART RATE: 84 BPM | OXYGEN SATURATION: 98 % | SYSTOLIC BLOOD PRESSURE: 126 MMHG | DIASTOLIC BLOOD PRESSURE: 98 MMHG | TEMPERATURE: 98 F | RESPIRATION RATE: 16 BRPM | BODY MASS INDEX: 33.57 KG/M2

## 2024-07-29 DIAGNOSIS — R07.89 CHEST WALL PAIN: Primary | ICD-10-CM

## 2024-07-29 DIAGNOSIS — R06.02 SOB (SHORTNESS OF BREATH): ICD-10-CM

## 2024-07-29 LAB
ALBUMIN SERPL BCP-MCNC: 3.4 G/DL (ref 3.5–5)
ALBUMIN/GLOB SERPL: 0.7 {RATIO}
ALP SERPL-CCNC: 153 U/L (ref 37–98)
ALT SERPL W P-5'-P-CCNC: 27 U/L (ref 13–56)
ANION GAP SERPL CALCULATED.3IONS-SCNC: 26 MMOL/L (ref 7–16)
AST SERPL W P-5'-P-CCNC: 15 U/L (ref 15–37)
BASOPHILS # BLD AUTO: 0.06 K/UL (ref 0–0.2)
BASOPHILS NFR BLD AUTO: 0.5 % (ref 0–1)
BILIRUB SERPL-MCNC: 0.3 MG/DL (ref ?–1.2)
BUN SERPL-MCNC: 17 MG/DL (ref 7–18)
BUN/CREAT SERPL: 22 (ref 6–20)
CALCIUM SERPL-MCNC: 9.5 MG/DL (ref 8.5–10.1)
CHLORIDE SERPL-SCNC: 101 MMOL/L (ref 98–107)
CO2 SERPL-SCNC: 15 MMOL/L (ref 21–32)
CREAT SERPL-MCNC: 0.79 MG/DL (ref 0.55–1.02)
DIFFERENTIAL METHOD BLD: ABNORMAL
EGFR (NO RACE VARIABLE) (RUSH/TITUS): 102 ML/MIN/1.73M2
EOSINOPHIL # BLD AUTO: 0.26 K/UL (ref 0–0.5)
EOSINOPHIL NFR BLD AUTO: 2.2 % (ref 1–4)
ERYTHROCYTE [DISTWIDTH] IN BLOOD BY AUTOMATED COUNT: 15.8 % (ref 11.5–14.5)
GLOBULIN SER-MCNC: 4.8 G/DL (ref 2–4)
GLUCOSE SERPL-MCNC: 290 MG/DL (ref 70–105)
GLUCOSE SERPL-MCNC: 310 MG/DL (ref 74–106)
HCT VFR BLD AUTO: 39.7 % (ref 38–47)
HGB BLD-MCNC: 12.6 G/DL (ref 12–16)
LIPASE SERPL-CCNC: 40 U/L (ref 16–77)
LYMPHOCYTES # BLD AUTO: 3.21 K/UL (ref 1–4.8)
LYMPHOCYTES NFR BLD AUTO: 27.1 % (ref 27–41)
MAGNESIUM SERPL-MCNC: 1.8 MG/DL (ref 1.7–2.3)
MCH RBC QN AUTO: 27.2 PG (ref 27–31)
MCHC RBC AUTO-ENTMCNC: 31.7 G/DL (ref 32–36)
MCV RBC AUTO: 85.6 FL (ref 80–96)
MONOCYTES # BLD AUTO: 0.91 K/UL (ref 0–0.8)
MONOCYTES NFR BLD AUTO: 7.7 % (ref 2–6)
MPC BLD CALC-MCNC: 8.8 FL (ref 9.4–12.4)
NEUTROPHILS # BLD AUTO: 7.42 K/UL (ref 1.8–7.7)
NEUTROPHILS NFR BLD AUTO: 62.5 % (ref 53–65)
NT-PROBNP SERPL-MCNC: 13 PG/ML (ref 1–125)
PLATELET # BLD AUTO: 454 K/UL (ref 150–400)
POTASSIUM SERPL-SCNC: 4.4 MMOL/L (ref 3.5–5.1)
PROT SERPL-MCNC: 8.2 G/DL (ref 6.4–8.2)
RBC # BLD AUTO: 4.64 M/UL (ref 4.2–5.4)
SODIUM SERPL-SCNC: 138 MMOL/L (ref 136–145)
TROPONIN I SERPL DL<=0.01 NG/ML-MCNC: <4 PG/ML
WBC # BLD AUTO: 11.86 K/UL (ref 4.5–11)

## 2024-07-29 PROCEDURE — 85025 COMPLETE CBC W/AUTO DIFF WBC: CPT | Performed by: NURSE PRACTITIONER

## 2024-07-29 PROCEDURE — 99285 EMERGENCY DEPT VISIT HI MDM: CPT | Mod: 25

## 2024-07-29 PROCEDURE — 84484 ASSAY OF TROPONIN QUANT: CPT | Performed by: NURSE PRACTITIONER

## 2024-07-29 PROCEDURE — 83690 ASSAY OF LIPASE: CPT | Performed by: NURSE PRACTITIONER

## 2024-07-29 PROCEDURE — 82962 GLUCOSE BLOOD TEST: CPT

## 2024-07-29 PROCEDURE — 83880 ASSAY OF NATRIURETIC PEPTIDE: CPT | Performed by: NURSE PRACTITIONER

## 2024-07-29 PROCEDURE — 94761 N-INVAS EAR/PLS OXIMETRY MLT: CPT

## 2024-07-29 PROCEDURE — 80053 COMPREHEN METABOLIC PANEL: CPT | Performed by: NURSE PRACTITIONER

## 2024-07-29 PROCEDURE — 93005 ELECTROCARDIOGRAM TRACING: CPT

## 2024-07-29 PROCEDURE — 83735 ASSAY OF MAGNESIUM: CPT | Performed by: NURSE PRACTITIONER

## 2024-07-29 RX ORDER — CARVEDILOL 6.25 MG/1
6.25 TABLET ORAL 2 TIMES DAILY WITH MEALS
COMMUNITY

## 2024-07-29 RX ORDER — INSULIN GLARGINE 100 [IU]/ML
10 INJECTION, SOLUTION SUBCUTANEOUS NIGHTLY
COMMUNITY

## 2024-07-29 RX ORDER — ATORVASTATIN CALCIUM 80 MG/1
80 TABLET, FILM COATED ORAL DAILY
COMMUNITY
Start: 2024-03-23

## 2024-07-30 NOTE — ED NOTES
Patient discharged to home via private vehicle with mother, discharge instructions given with voiced understanding. PEPE

## 2024-07-30 NOTE — ED TRIAGE NOTES
Patient arrived via private vehicle, with mother with c/o having some sob and chest pain that started around 3 pm today, stated that the chest pain comes and goes.

## 2024-07-30 NOTE — ED PROVIDER NOTES
Encounter Date: 2024       History     Chief Complaint   Patient presents with    Shortness of Breath    Chest Pain     Started around 3 pm today.     35 y/o AAF with PMHx of HTN, seizure disorder, and CVA with left hemiparesis presents pov per mother with c/o SOB with onset 6 hours PTA to ED followed by intermittent chest pain 30 minutes after onset of SOB. Negative for fever/chills or cough. Locates chest pain mid to right chest and rates pain 7/10 at it's worst. CP last less than one minutes with multiple episodes. Patient received call from PCP today and was to discontinue Metformin and start Lantus 10 units tonight. Patient states she has not taken the Lantus or her Coreg yet tonight.    The history is provided by the patient.     Review of patient's allergies indicates:   Allergen Reactions    Iodopropynyl butylcarbamate Dermatitis    Ciprofloxacin Hives and Rash    Latex Hives and Rash     Past Medical History:   Diagnosis Date    CVA (cerebral vascular accident) 2024    Mild hyperemesis gravidarum 10/01/2022     Past Surgical History:   Procedure Laterality Date    APPENDECTOMY       SECTION  2015    CHOLECYSTECTOMY  2016    TONSILLECTOMY  1999     Family History   Problem Relation Name Age of Onset    No Known Problems Mother      No Known Problems Father      No Known Problems Sister      No Known Problems Brother       Social History     Tobacco Use    Smoking status: Never    Smokeless tobacco: Never   Substance Use Topics    Alcohol use: Not Currently    Drug use: Never     Review of Systems   Constitutional:  Negative for chills and fever.   HENT: Negative.     Respiratory:  Positive for shortness of breath. Negative for apnea, cough, choking, chest tightness, wheezing and stridor.    Cardiovascular:  Positive for chest pain. Negative for palpitations and leg swelling.   Gastrointestinal: Negative.  Negative for nausea and vomiting.   Genitourinary: Negative.    Musculoskeletal:  Negative.    Skin: Negative.    Neurological: Negative.    Psychiatric/Behavioral: Negative.     All other systems reviewed and are negative.      Physical Exam     Initial Vitals [07/29/24 2205]   BP Pulse Resp Temp SpO2   (!) 148/101 104 18 98 °F (36.7 °C) 99 %      MAP       --         Physical Exam    Nursing note and vitals reviewed.  Constitutional: She appears well-developed and well-nourished. No distress.   HENT:   Head: Normocephalic.   Eyes: Conjunctivae and EOM are normal. No scleral icterus.   Cardiovascular:  Regular rhythm, S1 normal, S2 normal, normal heart sounds, intact distal pulses and normal pulses.   Tachycardia present.  PMI is not displaced.  Exam reveals no gallop, no distant heart sounds and no friction rub.       No murmur heard.  Pulmonary/Chest: Breath sounds normal. She exhibits tenderness.   Abdominal: Abdomen is soft. Bowel sounds are normal.   Musculoskeletal:         General: No tenderness. Normal range of motion.     Neurological: She is alert and oriented to person, place, and time. She has normal strength.   Skin: Skin is warm and dry. Capillary refill takes less than 2 seconds.   Psychiatric: She has a normal mood and affect. Her behavior is normal. Judgment and thought content normal.         Medical Screening Exam   See Full Note    ED Course   Procedures  Labs Reviewed   COMPREHENSIVE METABOLIC PANEL - Abnormal       Result Value    Sodium 138      Potassium 4.4      Chloride 101      CO2 15 (*)     Anion Gap 26 (*)     Glucose 310 (*)     BUN 17      Creatinine 0.79      BUN/Creatinine Ratio 22 (*)     Calcium 9.5      Total Protein 8.2      Albumin 3.4 (*)     Globulin 4.8 (*)     A/G Ratio 0.7      Bilirubin, Total 0.3      Alk Phos 153 (*)     ALT 27      AST 15      eGFR 102     CBC WITH DIFFERENTIAL - Abnormal    WBC 11.86 (*)     RBC 4.64      Hemoglobin 12.6      Hematocrit 39.7      MCV 85.6      MCH 27.2      MCHC 31.7 (*)     RDW 15.8 (*)     Platelet Count 454 (*)      MPV 8.8 (*)     Neutrophils % 62.5      Lymphocytes % 27.1      Neutrophils, Abs 7.42      Lymphocytes, Absolute 3.21      Diff Type Auto      Monocytes % 7.7 (*)     Eosinophils % 2.2      Basophils % 0.5      Monocytes, Absolute 0.91 (*)     Eosinophils, Absolute 0.26      Basophils, Absolute 0.06     POCT GLUCOSE MONITORING CONTINUOUS - Abnormal    POC Glucose 290 (*)    NT-PRO NATRIURETIC PEPTIDE - Normal    ProBNP 13     TROPONIN I - Normal    Troponin I High Sensitivity <4.0     MAGNESIUM - Normal    Magnesium 1.8     LIPASE - Normal    Lipase 40     CBC W/ AUTO DIFFERENTIAL    Narrative:     The following orders were created for panel order CBC auto differential.  Procedure                               Abnormality         Status                     ---------                               -----------         ------                     CBC with Differential[[id number]]       Abnormal            Final result                 Please view results for these tests on the individual orders.   POCT GLUCOSE MONITORING CONTINUOUS          Imaging Results              X-Ray Chest 1 View (In process)                      Medications - No data to display  Medical Decision Making  35 y/o AAF with PMHx of HTN, seizure disorder, and CVA with left hemiparesis presents pov per mother with c/o SOB with onset 6 hours PTA to ED followed by intermittent chest pain 30 minutes after onset of SOB. Negative for fever/chills or cough. Locates chest pain mid to right chest and rates pain 7/10 at it's worst. CP last less than one minutes with multiple episodes. Patient received call from PCP today and was to discontinue Metformin and start Lantus 10 units tonight. Patient states she has not taken the Lantus or her Coreg yet tonight.    Amount and/or Complexity of Data Reviewed  Labs: ordered.     Details: CBC: Unremarkable  CMP: Glucose 310, Anion Gap 26  BNP: 13  Troponin: < 4.0  Lipase: 40  Magnesium: 1.8  Radiology: ordered.      Details: CXR: No acute pulmonary disease.                                      Clinical Impression:   Final diagnoses:  [R06.02] SOB (shortness of breath)  [R07.89] Chest wall pain (Primary)        ED Disposition Condition    Discharge Stable          ED Prescriptions    None       Follow-up Information       Follow up With Specialties Details Why Contact Info    Miguel Kelly, NP Internal Medicine Go to  As needed, for follow up [address] [place] [id number]  [phone]               Shahram Bach FNP  07/30/24 0013       Shahram Bach FNP  07/30/24 0013

## 2024-07-31 LAB
OHS QRS DURATION: 80 MS
OHS QTC CALCULATION: 442 MS

## 2024-09-22 ENCOUNTER — HOSPITAL ENCOUNTER (EMERGENCY)
Facility: HOSPITAL | Age: 34
Discharge: HOME | End: 2024-09-22
Payer: COMMERCIAL

## 2024-09-22 VITALS
DIASTOLIC BLOOD PRESSURE: 86 MMHG | RESPIRATION RATE: 18 BRPM | WEIGHT: 205 LBS | HEART RATE: 72 BPM | TEMPERATURE: 98 F | HEIGHT: 66 IN | OXYGEN SATURATION: 99 % | BODY MASS INDEX: 32.95 KG/M2 | SYSTOLIC BLOOD PRESSURE: 124 MMHG

## 2024-09-22 DIAGNOSIS — R42 DIZZINESS: Primary | ICD-10-CM

## 2024-09-22 LAB
ALBUMIN SERPL BCP-MCNC: 2.8 G/DL (ref 3.5–5)
ALBUMIN/GLOB SERPL: 0.6 {RATIO}
ALP SERPL-CCNC: 129 U/L (ref 37–98)
ALT SERPL W P-5'-P-CCNC: 23 U/L (ref 13–56)
ANION GAP SERPL CALCULATED.3IONS-SCNC: 12 MMOL/L (ref 7–16)
AST SERPL W P-5'-P-CCNC: 12 U/L (ref 15–37)
BASOPHILS # BLD AUTO: 0.03 K/UL (ref 0–0.2)
BASOPHILS NFR BLD AUTO: 0.4 % (ref 0–1)
BILIRUB SERPL-MCNC: 0.2 MG/DL (ref ?–1.2)
BILIRUB UR QL STRIP: NEGATIVE
BUN SERPL-MCNC: 10 MG/DL (ref 7–18)
BUN/CREAT SERPL: 16 (ref 6–20)
CALCIUM SERPL-MCNC: 9.2 MG/DL (ref 8.5–10.1)
CHLORIDE SERPL-SCNC: 107 MMOL/L (ref 98–107)
CLARITY UR: CLEAR
CO2 SERPL-SCNC: 25 MMOL/L (ref 21–32)
COLOR UR: YELLOW
CREAT SERPL-MCNC: 0.64 MG/DL (ref 0.55–1.02)
DIFFERENTIAL METHOD BLD: ABNORMAL
EGFR (NO RACE VARIABLE) (RUSH/TITUS): 121 ML/MIN/1.73M2
EOSINOPHIL # BLD AUTO: 0.25 K/UL (ref 0–0.5)
EOSINOPHIL NFR BLD AUTO: 3.3 % (ref 1–4)
ERYTHROCYTE [DISTWIDTH] IN BLOOD BY AUTOMATED COUNT: 14.7 % (ref 11.5–14.5)
GLOBULIN SER-MCNC: 4.6 G/DL (ref 2–4)
GLUCOSE SERPL-MCNC: 169 MG/DL (ref 74–106)
GLUCOSE UR STRIP-MCNC: >=1000 MG/DL
HCG UR QL IA.RAPID: NEGATIVE
HCT VFR BLD AUTO: 33.8 % (ref 38–47)
HGB BLD-MCNC: 10.3 G/DL (ref 12–16)
INFLUENZA A MOLECULAR (OHS): NEGATIVE
INFLUENZA B MOLECULAR (OHS): NEGATIVE
KETONES UR STRIP-SCNC: NEGATIVE MG/DL
LEUKOCYTE ESTERASE UR QL STRIP: NEGATIVE
LYMPHOCYTES # BLD AUTO: 2.11 K/UL (ref 1–4.8)
LYMPHOCYTES NFR BLD AUTO: 28 % (ref 27–41)
MAGNESIUM SERPL-MCNC: 1.6 MG/DL (ref 1.7–2.3)
MCH RBC QN AUTO: 26.1 PG (ref 27–31)
MCHC RBC AUTO-ENTMCNC: 30.5 G/DL (ref 32–36)
MCV RBC AUTO: 85.8 FL (ref 80–96)
MONOCYTES # BLD AUTO: 0.5 K/UL (ref 0–0.8)
MONOCYTES NFR BLD AUTO: 6.6 % (ref 2–6)
MPC BLD CALC-MCNC: 8.1 FL (ref 9.4–12.4)
NEUTROPHILS # BLD AUTO: 4.64 K/UL (ref 1.8–7.7)
NEUTROPHILS NFR BLD AUTO: 61.7 % (ref 53–65)
NITRITE UR QL STRIP: NEGATIVE
PH UR STRIP: 7 PH UNITS
PLATELET # BLD AUTO: 399 K/UL (ref 150–400)
POTASSIUM SERPL-SCNC: 3.8 MMOL/L (ref 3.5–5.1)
PROT SERPL-MCNC: 7.4 G/DL (ref 6.4–8.2)
PROT UR QL STRIP: NEGATIVE
RBC # BLD AUTO: 3.94 M/UL (ref 4.2–5.4)
RBC # UR STRIP: NEGATIVE /UL
SARS-COV+SARS-COV-2 AG RESP QL IA.RAPID: NEGATIVE
SODIUM SERPL-SCNC: 140 MMOL/L (ref 136–145)
SP GR UR STRIP: 1.01
TROPONIN I SERPL DL<=0.01 NG/ML-MCNC: 4.4 PG/ML
UROBILINOGEN UR STRIP-ACNC: 0.2 MG/DL
WBC # BLD AUTO: 7.53 K/UL (ref 4.5–11)

## 2024-09-22 PROCEDURE — 81003 URINALYSIS AUTO W/O SCOPE: CPT | Performed by: NURSE PRACTITIONER

## 2024-09-22 PROCEDURE — 93005 ELECTROCARDIOGRAM TRACING: CPT

## 2024-09-22 PROCEDURE — 99285 EMERGENCY DEPT VISIT HI MDM: CPT | Mod: 25

## 2024-09-22 PROCEDURE — 87502 INFLUENZA DNA AMP PROBE: CPT | Performed by: NURSE PRACTITIONER

## 2024-09-22 PROCEDURE — 85025 COMPLETE CBC W/AUTO DIFF WBC: CPT | Performed by: NURSE PRACTITIONER

## 2024-09-22 PROCEDURE — 83735 ASSAY OF MAGNESIUM: CPT | Performed by: NURSE PRACTITIONER

## 2024-09-22 PROCEDURE — 80053 COMPREHEN METABOLIC PANEL: CPT | Performed by: NURSE PRACTITIONER

## 2024-09-22 PROCEDURE — 87426 SARSCOV CORONAVIRUS AG IA: CPT | Performed by: NURSE PRACTITIONER

## 2024-09-22 PROCEDURE — 81025 URINE PREGNANCY TEST: CPT | Performed by: NURSE PRACTITIONER

## 2024-09-22 PROCEDURE — 84484 ASSAY OF TROPONIN QUANT: CPT | Performed by: NURSE PRACTITIONER

## 2024-09-22 RX ORDER — ESCITALOPRAM OXALATE 10 MG/1
10 TABLET ORAL
COMMUNITY
Start: 2024-09-18

## 2024-09-22 RX ORDER — CARVEDILOL 3.12 MG/1
3.12 TABLET ORAL 2 TIMES DAILY
COMMUNITY
Start: 2024-09-18

## 2024-09-22 RX ORDER — LANCETS 33 GAUGE
EACH MISCELLANEOUS
COMMUNITY
Start: 2024-08-30

## 2024-09-22 RX ORDER — LEVETIRACETAM 1000 MG/1
1000 TABLET ORAL 2 TIMES DAILY
COMMUNITY

## 2024-09-22 RX ORDER — DAPAGLIFLOZIN 10 MG/1
10 TABLET, FILM COATED ORAL
COMMUNITY

## 2024-09-22 RX ORDER — TIZANIDINE 2 MG/1
2 TABLET ORAL NIGHTLY
COMMUNITY
Start: 2024-09-18

## 2024-09-22 RX ORDER — PEN NEEDLE, DIABETIC 31 GX5/16"
NEEDLE, DISPOSABLE MISCELLANEOUS
COMMUNITY
Start: 2024-07-29

## 2024-09-22 RX ORDER — INSULIN GLARGINE 100 [IU]/ML
INJECTION, SOLUTION SUBCUTANEOUS
COMMUNITY
Start: 2024-07-29

## 2024-09-22 RX ORDER — BLOOD SUGAR DIAGNOSTIC
STRIP MISCELLANEOUS
COMMUNITY
Start: 2024-08-30

## 2024-09-22 RX ORDER — CALCIUM CARBONATE 500(1250)
TABLET ORAL
COMMUNITY
Start: 2024-03-21

## 2024-09-22 RX ORDER — BACLOFEN 10 MG/1
10 TABLET ORAL 3 TIMES DAILY
COMMUNITY

## 2024-09-22 RX ORDER — LANCETS 30 GAUGE
EACH MISCELLANEOUS 2 TIMES DAILY
COMMUNITY
Start: 2024-08-30

## 2024-09-22 RX ORDER — MECLIZINE HYDROCHLORIDE 25 MG/1
25 TABLET ORAL 3 TIMES DAILY PRN
Qty: 20 TABLET | Refills: 0 | Status: SHIPPED | OUTPATIENT
Start: 2024-09-22

## 2024-09-22 NOTE — ED PROVIDER NOTES
Encounter Date: 2024       History     Chief Complaint   Patient presents with    Facial Pain     C/o left sided facial pain after feeling dizzy and falling to floor. Denies blood thinners.      35 y/o AAF with PMHx of CVA presents pov per family member with c/o dizziness upon awakening this morning. States she has fallen twice this morning and fell on her left face. She now has numbness of left face.        Review of patient's allergies indicates:   Allergen Reactions    Ciprofloxacin Hives and Rash     Past Medical History:   Diagnosis Date    CVA (cerebral vascular accident) 2024    Mild hyperemesis gravidarum 10/01/2022     Past Surgical History:   Procedure Laterality Date    APPENDECTOMY       SECTION  2015    CHOLECYSTECTOMY  2016    TONSILLECTOMY       Family History   Problem Relation Name Age of Onset    No Known Problems Mother      No Known Problems Father      No Known Problems Sister      No Known Problems Brother       Social History     Tobacco Use    Smoking status: Never    Smokeless tobacco: Never   Substance Use Topics    Alcohol use: Not Currently    Drug use: Never     Review of Systems   Constitutional:  Negative for chills and fever.   HENT: Negative.  Negative for sinus pressure and sinus pain.    Eyes: Negative.    Respiratory: Negative.  Negative for apnea, cough, choking, chest tightness, shortness of breath, wheezing and stridor.    Cardiovascular: Negative.  Negative for chest pain, palpitations and leg swelling.   Gastrointestinal: Negative.    Genitourinary: Negative.    Musculoskeletal: Negative.    Neurological:  Positive for numbness. Negative for dizziness, tremors, seizures, syncope, facial asymmetry, speech difficulty, weakness, light-headedness and headaches.   Psychiatric/Behavioral: Negative.     All other systems reviewed and are negative.      Physical Exam     Initial Vitals [24 1504]   BP Pulse Resp Temp SpO2   (!) 139/94 70 18 98.2 °F (36.8  °C) 99 %      MAP       --         Physical Exam    Nursing note and vitals reviewed.  Constitutional: She appears well-developed and well-nourished. No distress.   HENT:   Head: Normocephalic.   Right Ear: Tympanic membrane and ear canal normal.   Left Ear: Tympanic membrane and ear canal normal.   Nose: Nose normal. Right sinus exhibits no maxillary sinus tenderness and no frontal sinus tenderness. Left sinus exhibits no maxillary sinus tenderness and no frontal sinus tenderness.   Mouth/Throat: Uvula is midline, oropharynx is clear and moist and mucous membranes are normal.   Eyes: Conjunctivae and EOM are normal. Pupils are equal, round, and reactive to light. No scleral icterus.   Neck: Neck supple.   Normal range of motion.  Cardiovascular:  Normal rate, regular rhythm, normal heart sounds and intact distal pulses.     Exam reveals no gallop and no friction rub.       No murmur heard.  Pulmonary/Chest: Breath sounds normal. No respiratory distress. She has no wheezes. She has no rhonchi. She has no rales. She exhibits no tenderness.   Abdominal: Abdomen is soft. Bowel sounds are normal.   Musculoskeletal:         General: No tenderness. Normal range of motion.      Cervical back: Normal range of motion and neck supple.     Neurological: She is alert and oriented to person, place, and time. She has normal strength. She displays normal reflexes. No cranial nerve deficit or sensory deficit. GCS score is 15. GCS eye subscore is 4. GCS verbal subscore is 5. GCS motor subscore is 6.   Skin: Skin is warm and dry. Capillary refill takes less than 2 seconds.   Psychiatric: She has a normal mood and affect. Her behavior is normal. Judgment and thought content normal.         Medical Screening Exam   See Full Note    ED Course   Procedures  Labs Reviewed   COMPREHENSIVE METABOLIC PANEL - Abnormal       Result Value    Sodium 140      Potassium 3.8      Chloride 107      CO2 25      Anion Gap 12      Glucose 169 (*)      BUN 10      Creatinine 0.64      BUN/Creatinine Ratio 16      Calcium 9.2      Total Protein 7.4      Albumin 2.8 (*)     Globulin 4.6 (*)     A/G Ratio 0.6      Bilirubin, Total 0.2      Alk Phos 129 (*)     ALT 23      AST 12 (*)     eGFR 121     MAGNESIUM - Abnormal    Magnesium 1.6 (*)    CBC WITH DIFFERENTIAL - Abnormal    WBC 7.53      RBC 3.94 (*)     Hemoglobin 10.3 (*)     Hematocrit 33.8 (*)     MCV 85.8      MCH 26.1 (*)     MCHC 30.5 (*)     RDW 14.7 (*)     Platelet Count 399      MPV 8.1 (*)     Neutrophils % 61.7      Lymphocytes % 28.0      Neutrophils, Abs 4.64      Lymphocytes, Absolute 2.11      Diff Type Auto      Monocytes % 6.6 (*)     Eosinophils % 3.3      Basophils % 0.4      Monocytes, Absolute 0.50      Eosinophils, Absolute 0.25      Basophils, Absolute 0.03     URINALYSIS, REFLEX TO URINE CULTURE - Abnormal    Color, UA Yellow      Clarity, UA Clear      pH, UA 7.0      Leukocytes, UA Negative      Nitrites, UA Negative      Protein, UA Negative      Glucose, UA >=1000 (*)     Ketones, UA Negative      Urobilinogen, UA 0.2      Bilirubin, UA Negative      Blood, UA Negative      Specific Gravity, UA 1.015     INFLUENZA A & B BY MOLECULAR - Normal    INFLUENZA A MOLECULAR Negative      INFLUENZA B MOLECULAR  Negative     TROPONIN I - Normal    Troponin I High Sensitivity 4.4     SARS ANTIGEN(DAYNE) - Normal    COVID-19 Ag Negative      Narrative:     Negative SARS-CoV results should not be used as the sole basis for treatment or patient management decisions; negative results should be considered in the context of a patient's recent exposures, history and the presene of clinical signs and symptoms consistent with COVID-19.  Negative results should be treated as presumptive and confirmed by molecular assay, if necessary for patient management.   HCG QUALITATIVE URINE - Normal    HCG Qualitative, Urine Negative     CBC W/ AUTO DIFFERENTIAL    Narrative:     The following orders were created  for panel order CBC auto differential.  Procedure                               Abnormality         Status                     ---------                               -----------         ------                     CBC with Differential[[id number]]       Abnormal            Final result                 Please view results for these tests on the individual orders.     EKG Readings: (Independently Interpreted)   Rhythm: Normal Sinus Rhythm. Ectopy: No Ectopy. Conduction: Normal. ST Segments: Normal ST Segments. T Waves: Normal. Clinical Impression: Normal Sinus Rhythm       Imaging Results              CT Head Without Contrast (Final result)  Result time 09/22/24 17:03:22      Final result by Singh Mckenzie MD (09/22/24 17:03:22)                   Impression:      1. No acute intracranial process with no significant change.  2. See above comments.      Electronically signed by: Singh Mckenzie  Date:    09/22/2024  Time:    17:03               Narrative:    EXAMINATION:  CT HEAD WITHOUT CONTRAST    CLINICAL HISTORY:  Dizziness, persistent/recurrent, cardiac or vascular cause suspected;    TECHNIQUE:  Low-dose axial only images are submitted through the head.  No sagittal or coronal reconstructions.    COMPARISON:  05/13/2024    FINDINGS:  Intracranial compartment:    Ventricles and sulci are normal in size for age without evidence of hydrocephalus. No extra-axial blood or fluid collections.    Bilateral remote areas of lacunar infarction in the cerebellum.  Small remote left occipital cortical infarct with probable bilateral chronic microvascular ischemic changes or gliosis in the bilateral occipital lobes.    Small remote lacunar infarct of the right thalamus.  No significant change.    Right frontal calvarial thom hole.    No parenchymal mass, hemorrhage, edema or acute major vascular distribution infarct.    Skull/extracranial contents (limited evaluation): No fracture. Mastoid air cells and paranasal sinuses are  essentially clear.                                       X-Ray Chest 1 View (Final result)  Result time 09/22/24 17:34:33      Final result by Singh Mckenzie MD (09/22/24 17:34:33)                   Impression:      No acute radiographic abnormality.      Electronically signed by: Singh Mckenzie  Date:    09/22/2024  Time:    17:34               Narrative:    EXAMINATION:  XR CHEST 1 VIEW    CLINICAL HISTORY:  Dizziness and giddiness    TECHNIQUE:  Single frontal view of the chest was performed.    COMPARISON:  07/29/2024    FINDINGS:  The lungs are clear, with normal appearance of pulmonary vasculature and no pleural effusion or pneumothorax.    The cardiac silhouette is normal in size. The hilar and mediastinal contours are unremarkable.    Bones are intact.                                       Medications - No data to display  Medical Decision Making  35 y/o AAF with PMHx of CVA presents pov per family member with c/o dizziness upon awakening this morning. States she has fallen twice this morning and fell on her left face. She now has numbness of left face.    Amount and/or Complexity of Data Reviewed  Labs: ordered.     Details: CBC: H/H 10.3/33.8  CMP: Glucose 169  Troponin: 4.4  Magnesium 1.6  Covid: Negative  Flu: Negative  UA: WNL  UPT: Negative  Radiology: ordered.     Details: CXR:  CT Head: No acute intracranial process with no significant change.                                      Clinical Impression:   Final diagnoses:  [R42] Dizziness (Primary)        ED Disposition Condition    Discharge Stable          ED Prescriptions       Medication Sig Dispense Start Date End Date Auth. Provider    meclizine (ANTIVERT) 25 mg tablet Take 1 tablet (25 mg total) by mouth 3 (three) times daily as needed for Dizziness. 20 tablet 9/22/2024 -- Shahram Bach FNP          Follow-up Information       Follow up With Specialties Details Why Contact Info    Miguel Kelly NP Internal Medicine Go to  As needed,  for follow up [address] [place] [id number]  [phone]               Shahram Bach FNP  09/22/24 1742       Shahram Bach FNP  09/22/24 1810

## 2024-09-23 LAB
OHS QRS DURATION: 84 MS
OHS QTC CALCULATION: 428 MS

## 2025-04-05 ENCOUNTER — HOSPITAL ENCOUNTER (EMERGENCY)
Facility: HOSPITAL | Age: 35
Discharge: HOME | End: 2025-04-05
Payer: MEDICAID

## 2025-04-05 VITALS
OXYGEN SATURATION: 98 % | HEART RATE: 115 BPM | RESPIRATION RATE: 18 BRPM | TEMPERATURE: 99 F | BODY MASS INDEX: 35.16 KG/M2 | HEIGHT: 67 IN | SYSTOLIC BLOOD PRESSURE: 125 MMHG | WEIGHT: 224 LBS | DIASTOLIC BLOOD PRESSURE: 99 MMHG

## 2025-04-05 DIAGNOSIS — U07.1 COVID-19: Primary | ICD-10-CM

## 2025-04-05 LAB
GROUP A STREP MOLECULAR (OHS): NEGATIVE
INFLUENZA A MOLECULAR (OHS): NEGATIVE
INFLUENZA B MOLECULAR (OHS): NEGATIVE
SARS-COV-2 RDRP RESP QL NAA+PROBE: POSITIVE

## 2025-04-05 PROCEDURE — 99284 EMERGENCY DEPT VISIT MOD MDM: CPT | Mod: ,,, | Performed by: NURSE PRACTITIONER

## 2025-04-05 PROCEDURE — 87635 SARS-COV-2 COVID-19 AMP PRB: CPT | Performed by: NURSE PRACTITIONER

## 2025-04-05 PROCEDURE — 99283 EMERGENCY DEPT VISIT LOW MDM: CPT

## 2025-04-05 PROCEDURE — 87502 INFLUENZA DNA AMP PROBE: CPT | Performed by: NURSE PRACTITIONER

## 2025-04-05 PROCEDURE — 87651 STREP A DNA AMP PROBE: CPT | Performed by: NURSE PRACTITIONER

## 2025-04-05 PROCEDURE — 25000003 PHARM REV CODE 250: Performed by: NURSE PRACTITIONER

## 2025-04-05 RX ORDER — IBUPROFEN 600 MG/1
600 TABLET ORAL
Status: COMPLETED | OUTPATIENT
Start: 2025-04-05 | End: 2025-04-05

## 2025-04-05 RX ADMIN — IBUPROFEN 600 MG: 600 TABLET, FILM COATED ORAL at 06:04

## 2025-04-05 NOTE — ED PROVIDER NOTES
Encounter Date: 2025       History     Chief Complaint   Patient presents with    Chills    Nausea    Headache     Started yesterday, 8/10 on numeric pain scale    Sore Throat     Started yesterday. Pt. Reported caregiver has been diagnosed with flu, covid, and strep.      36 y/o AAF presents pov with c/o headache, body aches, chills, and sore throat with onset yesterday. Negative for fever. Mom has strep pharyngitis, Flu, and Covid  and is patient's care giver at home. Headache located frontal region. Rates pain 7/10.    The history is provided by the patient.     Review of patient's allergies indicates:   Allergen Reactions    Ciprofloxacin Hives and Rash     Past Medical History:   Diagnosis Date    CVA (cerebral vascular accident) 2024    Mild hyperemesis gravidarum 10/01/2022     Past Surgical History:   Procedure Laterality Date    APPENDECTOMY       SECTION  2015    CHOLECYSTECTOMY  2016    TONSILLECTOMY  1999     Family History   Problem Relation Name Age of Onset    No Known Problems Mother      No Known Problems Father      No Known Problems Sister      No Known Problems Brother       Social History[1]  Review of Systems   Constitutional:  Negative for chills and fever.   HENT:  Positive for sore throat. Negative for congestion, ear discharge and trouble swallowing.    Respiratory:  Positive for cough. Negative for apnea, choking, chest tightness, shortness of breath, wheezing and stridor.    Cardiovascular: Negative.  Negative for chest pain, palpitations and leg swelling.   Gastrointestinal: Negative.    Genitourinary: Negative.    Musculoskeletal: Negative.    Neurological: Negative.    Psychiatric/Behavioral: Negative.     All other systems reviewed and are negative.      Physical Exam     Initial Vitals [25 1837]   BP Pulse Resp Temp SpO2   (!) 125/99 (!) 115 18 98.9 °F (37.2 °C) 98 %      MAP       --         Physical Exam    Nursing note and vitals reviewed.  Constitutional:  She appears well-developed and well-nourished. No distress.   HENT:   Head: Normocephalic.   Right Ear: Tympanic membrane and ear canal normal.   Left Ear: Tympanic membrane and ear canal normal.   Nose: Nose normal. Right sinus exhibits no maxillary sinus tenderness and no frontal sinus tenderness. Left sinus exhibits no maxillary sinus tenderness and no frontal sinus tenderness. Mouth/Throat: Uvula is midline, oropharynx is clear and moist and mucous membranes are normal.   Eyes: Conjunctivae and EOM are normal. Pupils are equal, round, and reactive to light.   Neck: Neck supple.   Normal range of motion.  Cardiovascular:  Normal rate, regular rhythm, normal heart sounds and intact distal pulses.     Exam reveals no gallop and no friction rub.       No murmur heard.  Pulmonary/Chest: Breath sounds normal. No respiratory distress. She has no wheezes. She has no rhonchi. She has no rales. She exhibits no tenderness.   Abdominal: Abdomen is soft. Bowel sounds are normal.   Musculoskeletal:         General: No tenderness. Normal range of motion.      Cervical back: Normal range of motion and neck supple.     Lymphadenopathy:     She has no cervical adenopathy.   Neurological: She is alert and oriented to person, place, and time. She has normal strength. She displays normal reflexes. No cranial nerve deficit or sensory deficit.   Skin: Skin is warm and dry. Capillary refill takes less than 2 seconds.   Psychiatric: She has a normal mood and affect. Her behavior is normal. Judgment and thought content normal.         Medical Screening Exam   See Full Note    ED Course   Procedures  Labs Reviewed   SARS-COV-2 RNA AMPLIFICATION, QUAL - Abnormal       Result Value    SARS COV-2 Molecular Positive (*)    INFLUENZA A & B BY MOLECULAR - Normal    INFLUENZA A MOLECULAR Negative      INFLUENZA B MOLECULAR  Negative     STREP A BY MOLECULAR METHOD - Normal    Group A Strep Molecular Negative            Imaging Results    None           Medications   ibuprofen tablet 600 mg (600 mg Oral Given 4/5/25 1174)     Medical Decision Making  34 y/o AAF presents pov with c/o headache, body aches, chills, and sore throat with onset yesterday. Negative for fever. Mom has strep pharyngitis, Flu, and Covid  and is patient's care giver at home. Headache located frontal region. Rates pain 7/10.    Amount and/or Complexity of Data Reviewed  Labs: ordered.     Details: Covid: Positive  Flu: Negative  Strep: Negative    Risk  Prescription drug management.                                      Clinical Impression:   Final diagnoses:  [U07.1] COVID-19 (Primary)        ED Disposition Condition    Discharge Stable          ED Prescriptions    None       Follow-up Information       Follow up With Specialties Details Why Contact Info    Miguel Kelly NP Internal Medicine Go to  As needed, for follow up [address] [place] [address] [id number] [phone]                 [1]   Social History  Tobacco Use    Smoking status: Never    Smokeless tobacco: Never   Substance Use Topics    Alcohol use: Not Currently    Drug use: Never        Shahram Bach FNP  04/05/25 4308

## 2025-04-07 ENCOUNTER — PATIENT OUTREACH (OUTPATIENT)
Facility: OTHER | Age: 35
End: 2025-04-07
Payer: COMMERCIAL

## 2025-04-07 NOTE — PROGRESS NOTES
4/7/25  Ed navigator first enrollment outreach attempt-unable to contact patient  Ed navigator unable to leave a message at this time  Ed navigator will follow up on or around 4/8/25  Paola Oshea Lpn-Ed Navigator  # [phone]      4/8/25  Paola Oshea LPN  ED Navigator  Emergency Department    Project: [facility] ED Navigator  Role: Community Health Worker    Date: 04/08/2025  Patient Name: Twila Dejesus  MRN: [id number]  PCP: Miguel Kelly NP    Assessment:     Twila Dejesus is a 35 y.o. female who has presented to ED for 4/5/25. Patient has visited the ED 1 times in the past 3 months. Patient did contact PCP.     ED Navigator Initial Assessment    ED Navigator Enrollment Documentation  Consent to Services  Does patient consent to completing the assessment?: Yes  Contact  Method of Initial Contact: Phone  Transportation  Insurance Coverage  Do you have coverage/adequate coverage?: Yes  Specialist Appointment  Did the patient come to the ED to see a specialist?: No  Does the patient have a pending specialist referral?: No  Does the patient have a specialist appointment made?: No  PCP Follow Up Appointment  Medications  Is patient able to afford medication?: Yes  Psychological  Food  Communication/Education  Other Financial Concerns  Other Social Barriers/Concerns  Primary Barrier  Plan: Provided information for [facility] 24/7 Nurse triage line, [phone] or [phone]-[facility] ([phone])         Social History     Socioeconomic History    Marital status: Single   Tobacco Use    Smoking status: Never    Smokeless tobacco: Never   Substance and Sexual Activity    Alcohol use: Not Currently    Drug use: Never    Sexual activity: Not Currently     Partners: Male     Social Drivers of Health     Food Insecurity: No Food Insecurity (4/8/2025)    Hunger Vital Sign     Worried About Running Out of Food in the Last Year: Never true     Ran Out of Food in the Last Year: Never true   Transportation Needs: No  Transportation Needs (4/8/2025)    PRAPARE - Transportation     Lack of Transportation (Medical): No     Lack of Transportation (Non-Medical): No   Housing Stability: Low Risk  (4/8/2025)    Housing Stability Vital Sign     Unable to Pay for Housing in the Last Year: No     Homeless in the Last Year: No       Plan:   Ed navigator will follow up on or around 5/8/25  Patient stated that she was feeling better, didn't need any assistance at this time just lost a loved one late last [facility]  Ed navigator ensured patient to contact ed navigator or [facility] nurse if any assistance is needed. Patient stated understanding       Appointment made with: Miguel Kelly NP seen 4/7/25

## 2025-05-08 ENCOUNTER — PATIENT OUTREACH (OUTPATIENT)
Facility: OTHER | Age: 35
End: 2025-05-08
Payer: COMMERCIAL

## 2025-05-08 NOTE — PROGRESS NOTES
5/8/25  Ed navigator first follow up attempt-unable to contact patient  Ed navigator will follow up with patient on or around 5/12/25  Theresa Cole [facility] Lpn-Ed Navigator  # [phone]

## 2025-05-12 ENCOUNTER — PATIENT OUTREACH (OUTPATIENT)
Facility: OTHER | Age: 35
End: 2025-05-12
Payer: COMMERCIAL

## 2025-05-12 NOTE — PROGRESS NOTES
5/12/25  Ed navigator second follow up outreach attempt-unable to contact patient  Ed navigator will follow up on or around 6/30/25  Ed navigator to close encounter at this time  Theresa Cole [facility] Lpn-Ed Navigator  # [phone]

## 2025-05-17 ENCOUNTER — HOSPITAL ENCOUNTER (EMERGENCY)
Facility: HOSPITAL | Age: 35
Discharge: HOME | End: 2025-05-17
Payer: MEDICAID

## 2025-05-17 VITALS
RESPIRATION RATE: 18 BRPM | WEIGHT: 220 LBS | DIASTOLIC BLOOD PRESSURE: 94 MMHG | OXYGEN SATURATION: 99 % | BODY MASS INDEX: 35.36 KG/M2 | SYSTOLIC BLOOD PRESSURE: 115 MMHG | HEART RATE: 92 BPM | TEMPERATURE: 98 F | HEIGHT: 66 IN

## 2025-05-17 DIAGNOSIS — E11.65 HYPERGLYCEMIA DUE TO DIABETES MELLITUS: Primary | ICD-10-CM

## 2025-05-17 DIAGNOSIS — R00.2 PALPITATIONS: ICD-10-CM

## 2025-05-17 DIAGNOSIS — E86.0 DEHYDRATION: ICD-10-CM

## 2025-05-17 DIAGNOSIS — R53.1 GENERALIZED WEAKNESS: ICD-10-CM

## 2025-05-17 DIAGNOSIS — R51.9 ACUTE NONINTRACTABLE HEADACHE, UNSPECIFIED HEADACHE TYPE: ICD-10-CM

## 2025-05-17 LAB
ALBUMIN SERPL BCP-MCNC: 3.4 G/DL (ref 3.5–5)
ALBUMIN/GLOB SERPL: 0.8 {RATIO}
ALP SERPL-CCNC: 170 U/L (ref 40–150)
ALT SERPL W P-5'-P-CCNC: 34 U/L
ANION GAP SERPL CALCULATED.3IONS-SCNC: 18 MMOL/L (ref 7–16)
AST SERPL W P-5'-P-CCNC: 38 U/L (ref 11–45)
BASOPHILS # BLD AUTO: 0.04 K/UL (ref 0–0.2)
BASOPHILS NFR BLD AUTO: 0.4 % (ref 0–1)
BILIRUB SERPL-MCNC: 0.4 MG/DL
BILIRUB UR QL STRIP: NEGATIVE
BUN SERPL-MCNC: 7 MG/DL (ref 7–19)
BUN/CREAT SERPL: 7 (ref 6–20)
CALCIUM SERPL-MCNC: 9.2 MG/DL (ref 8.4–10.2)
CHLORIDE SERPL-SCNC: 96 MMOL/L (ref 98–107)
CLARITY UR: CLEAR
CO2 SERPL-SCNC: 19 MMOL/L (ref 22–29)
COLOR UR: YELLOW
CREAT SERPL-MCNC: 1.05 MG/DL (ref 0.55–1.02)
DIFFERENTIAL METHOD BLD: ABNORMAL
EGFR (NO RACE VARIABLE) (RUSH/TITUS): 72 ML/MIN/1.73M2
EOSINOPHIL # BLD AUTO: 0.2 K/UL (ref 0–0.5)
EOSINOPHIL NFR BLD AUTO: 2.1 % (ref 1–4)
ERYTHROCYTE [DISTWIDTH] IN BLOOD BY AUTOMATED COUNT: 14.1 % (ref 11.5–14.5)
GLOBULIN SER-MCNC: 4.4 G/DL (ref 2–4)
GLUCOSE SERPL-MCNC: 393 MG/DL (ref 70–105)
GLUCOSE SERPL-MCNC: 689 MG/DL (ref 74–100)
GLUCOSE UR STRIP-MCNC: >=1000 MG/DL
HCO3 UR-SCNC: 26.6 MMOL/L (ref 24–28)
HCT VFR BLD AUTO: 39 % (ref 38–47)
HGB BLD-MCNC: 12.6 G/DL (ref 12–16)
KETONES UR STRIP-SCNC: 15 MG/DL
LACTATE SERPL-SCNC: 1.5 MMOL/L (ref 0.5–2.2)
LACTATE SERPL-SCNC: 5.1 MMOL/L (ref 0.5–2.2)
LEUKOCYTE ESTERASE UR QL STRIP: NEGATIVE
LYMPHOCYTES # BLD AUTO: 2.41 K/UL (ref 1–4.8)
LYMPHOCYTES NFR BLD AUTO: 25.7 % (ref 27–41)
MCH RBC QN AUTO: 28.3 PG (ref 27–31)
MCHC RBC AUTO-ENTMCNC: 32.3 G/DL (ref 32–36)
MCV RBC AUTO: 87.6 FL (ref 80–96)
MONOCYTES # BLD AUTO: 0.79 K/UL (ref 0–0.8)
MONOCYTES NFR BLD AUTO: 8.4 % (ref 2–6)
MPC BLD CALC-MCNC: 8.9 FL (ref 9.4–12.4)
NEUTROPHILS # BLD AUTO: 5.93 K/UL (ref 1.8–7.7)
NEUTROPHILS NFR BLD AUTO: 63.4 % (ref 53–65)
NITRITE UR QL STRIP: NEGATIVE
PCO2 BLDA: 46 MMHG (ref 41–51)
PH SMN: 7.37 [PH] (ref 7.32–7.42)
PH UR STRIP: 6 PH UNITS
PLATELET # BLD AUTO: 481 K/UL (ref 150–400)
PO2 BLDA: 33 MMHG (ref 25–40)
POC BASE EXCESS: 0.9 MMOL/L (ref -2–3)
POC SATURATED O2: 61 %
POTASSIUM SERPL-SCNC: 4 MMOL/L (ref 3.5–5.1)
PROT SERPL-MCNC: 7.8 G/DL (ref 6.4–8.3)
PROT UR QL STRIP: NEGATIVE
RBC # BLD AUTO: 4.45 M/UL (ref 4.2–5.4)
RBC # UR STRIP: NEGATIVE /UL
SODIUM SERPL-SCNC: 129 MMOL/L (ref 136–145)
SP GR UR STRIP: <=1.005
UROBILINOGEN UR STRIP-ACNC: 0.2 MG/DL
WBC # BLD AUTO: 9.37 K/UL (ref 4.5–11)

## 2025-05-17 PROCEDURE — 25000003 PHARM REV CODE 250: Performed by: NURSE PRACTITIONER

## 2025-05-17 PROCEDURE — 83605 ASSAY OF LACTIC ACID: CPT | Performed by: NURSE PRACTITIONER

## 2025-05-17 PROCEDURE — 63600175 PHARM REV CODE 636 W HCPCS: Performed by: NURSE PRACTITIONER

## 2025-05-17 PROCEDURE — 81003 URINALYSIS AUTO W/O SCOPE: CPT | Performed by: NURSE PRACTITIONER

## 2025-05-17 PROCEDURE — 80053 COMPREHEN METABOLIC PANEL: CPT | Performed by: NURSE PRACTITIONER

## 2025-05-17 PROCEDURE — 99285 EMERGENCY DEPT VISIT HI MDM: CPT | Mod: ,,, | Performed by: NURSE PRACTITIONER

## 2025-05-17 PROCEDURE — 93005 ELECTROCARDIOGRAM TRACING: CPT

## 2025-05-17 PROCEDURE — 93010 ELECTROCARDIOGRAM REPORT: CPT | Mod: ,,, | Performed by: INTERNAL MEDICINE

## 2025-05-17 PROCEDURE — 82803 BLOOD GASES ANY COMBINATION: CPT

## 2025-05-17 PROCEDURE — 99285 EMERGENCY DEPT VISIT HI MDM: CPT | Mod: 25

## 2025-05-17 PROCEDURE — 85025 COMPLETE CBC W/AUTO DIFF WBC: CPT | Performed by: NURSE PRACTITIONER

## 2025-05-17 PROCEDURE — 82962 GLUCOSE BLOOD TEST: CPT

## 2025-05-17 PROCEDURE — 96361 HYDRATE IV INFUSION ADD-ON: CPT

## 2025-05-17 PROCEDURE — 96374 THER/PROPH/DIAG INJ IV PUSH: CPT

## 2025-05-17 RX ORDER — FOLIC ACID 1 MG/1
1000 TABLET ORAL
COMMUNITY
Start: 2025-02-24

## 2025-05-17 RX ORDER — ACETAMINOPHEN 500 MG
1000 TABLET ORAL
Status: COMPLETED | OUTPATIENT
Start: 2025-05-17 | End: 2025-05-17

## 2025-05-17 RX ORDER — ASPIRIN 325 MG
325 TABLET ORAL
Status: COMPLETED | OUTPATIENT
Start: 2025-05-17 | End: 2025-05-17

## 2025-05-17 RX ORDER — HYDROXYZINE HYDROCHLORIDE 25 MG/1
25 TABLET, FILM COATED ORAL DAILY PRN
COMMUNITY
Start: 2025-03-18

## 2025-05-17 RX ADMIN — ASPIRIN 325 MG ORAL TABLET 325 MG: 325 PILL ORAL at 08:05

## 2025-05-17 RX ADMIN — SODIUM CHLORIDE 1000 ML: 9 INJECTION, SOLUTION INTRAVENOUS at 08:05

## 2025-05-17 RX ADMIN — ACETAMINOPHEN 1000 MG: 500 TABLET ORAL at 09:05

## 2025-05-17 RX ADMIN — HUMAN INSULIN 5 UNITS: 100 INJECTION, SOLUTION SUBCUTANEOUS at 09:05

## 2025-05-18 NOTE — ED NOTES
HA resolved.  Pt reports feeling better after IVF and Insulin given.  Mom remains at bedside.  Pt remains in SR.

## 2025-05-18 NOTE — DISCHARGE INSTRUCTIONS
Drink more water.  Limit sodas.  Take medication as directed- do not miss doses.  Check blood sugar as directed by your primary care provider.  Follow up with your primary care provider Monday to discuss today's visit and possible need to adjust insulin.  Return for worsening condition or emergency needs.

## 2025-05-18 NOTE — ED PROVIDER NOTES
"Encounter Date: 2025       History     Chief Complaint   Patient presents with    Headache     Right sided headache "brain freeze" since this morning around 0230.  Feels weaker on the left side, more so than usual.  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  Pt denies taking any DM meds this am.  Accu-Chek on arrival to ED "HI"  pt rates HA 7/10.  Denies taking any otc pain meds.  Pt able to stand with assist of one from pov to wc, then wc to stretcher.  Pt also, c/o being anxious since 1700 today.       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  called her step father because she was feeling dizzy and was afraid she would fall while in the bathroom this morning but she did not fall.   Reports she slept most of  today but woke at  1730 this evening and has been feeling anxious and having headache, palpitations and felt like eyes were crossing.   was worried her BS may be "off".   did not take DM or BP medications this morning.       The history is provided by the patient.   Headache   The current episode started today. The pain is located in the Right unilateral region. Quality: like a brain freeze. Associated symptoms include weakness. Her past medical history is significant for hypertension.     Review of patient's allergies indicates:   Allergen Reactions    Ciprofloxacin Hives and Rash     Past Medical History:   Diagnosis Date    CVA (cerebral vascular accident) 2024    Mild hyperemesis gravidarum 10/01/2022     Past Surgical History:   Procedure Laterality Date    APPENDECTOMY       SECTION  2015    CHOLECYSTECTOMY  2016    TONSILLECTOMY       Family History   Problem Relation Name Age of Onset    No Known Problems Mother      No Known " Problems Father      No Known Problems Sister      No Known Problems Brother       Social History[1]  Review of Systems   Constitutional:  Positive for activity change.   Respiratory: Negative.     Cardiovascular: Negative.    Skin: Negative.    Neurological:  Positive for weakness and headaches.       Physical Exam     Initial Vitals [05/17/25 1956]   BP Pulse Resp Temp SpO2   (!) 133/109 91 18 97.8 °F (36.6 °C) 100 %      MAP       --         Physical Exam    Cardiovascular:  Normal rate and regular rhythm.           Pulmonary/Chest: Breath sounds normal.   Abdominal: Abdomen is soft.     Neurological: She is alert and oriented to person, place, and time. A cranial nerve deficit is present.   Skin: Skin is warm and dry.   Psychiatric: She has a normal mood and affect.         Medical Screening Exam   See Full Note    ED Course   Procedures  Labs Reviewed   COMPREHENSIVE METABOLIC PANEL - Abnormal       Result Value    Sodium 129 (*)     Potassium 4.0      Chloride 96 (*)     CO2 19 (*)     Anion Gap 18 (*)     Glucose 689 (*)     BUN 7      Creatinine 1.05 (*)     BUN/Creatinine Ratio 7      Calcium 9.2      Total Protein 7.8      Albumin 3.4 (*)     Globulin 4.4 (*)     A/G Ratio 0.8      Bilirubin, Total 0.4      Alk Phos 170 (*)     ALT 34      AST 38      eGFR 72     URINALYSIS - Abnormal    Color, UA Yellow      Clarity, UA Clear      pH, UA 6.0      Leukocytes, UA Negative      Nitrites, UA Negative      Protein, UA Negative      Glucose, UA >=1000 (*)     Ketones, UA 15 (*)     Urobilinogen, UA 0.2      Bilirubin, UA Negative      Blood, UA Negative      Specific Gravity, UA <=1.005     CBC WITH DIFFERENTIAL - Abnormal    WBC 9.37      RBC 4.45      Hemoglobin 12.6      Hematocrit 39.0      MCV 87.6      MCH 28.3      MCHC 32.3      RDW 14.1      Platelet Count 481 (*)     MPV 8.9 (*)     Neutrophils % 63.4      Lymphocytes % 25.7 (*)     Neutrophils, Abs 5.93      Lymphocytes, Absolute 2.41      Diff Type  Auto      Monocytes % 8.4 (*)     Eosinophils % 2.1      Basophils % 0.4      Monocytes, Absolute 0.79      Eosinophils, Absolute 0.20      Basophils, Absolute 0.04     LACTIC ACID, PLASMA - Abnormal    Lactic Acid 5.1 (*)    POCT GLUCOSE MONITORING CONTINUOUS - Abnormal    POC Glucose 393 (*)    LACTIC ACID, PLASMA - Normal    Lactic Acid 1.5     CBC W/ AUTO DIFFERENTIAL    Narrative:     The following orders were created for panel order CBC Auto Differential.  Procedure                               Abnormality         Status                     ---------                               -----------         ------                     CBC with Differential[[id number]]       Abnormal            Final result                 Please view results for these tests on the individual orders.   LACTIC ACID, PLASMA   POCT GLUCOSE MONITORING CONTINUOUS   POCT GLUCOSE MONITORING CONTINUOUS          Imaging Results              CT Head Without Contrast (Final result)  Result time 05/17/25 20:18:21      Final result by Singh Mckenzie MD (05/17/25 20:18:21)                   Impression:      1. No acute intracranial process with no significant change.  MRI may better characterize if there is high clinical suspicion.  2. Multiple small remote lacunar infarcts involving the bilateral cerebellum, left occipital cortex, left posterior parietal cortex and right thalamus.  No significant change      Electronically signed by: Singh Mckenzie  Date:    05/17/2025  Time:    20:18               Narrative:    EXAMINATION:  CT HEAD WITHOUT CONTRAST    CLINICAL HISTORY:  Headache, new or worsening, neuro deficit (Age 19-49y);    TECHNIQUE:  Low dose axial CT images obtained throughout the head without intravenous contrast. Sagittal and coronal reconstructions were performed.    COMPARISON:  09/22/2024    FINDINGS:  Intracranial compartment:    Ventricles and sulci are normal in size for age without evidence of hydrocephalus. No extra-axial blood  "or fluid collections.    Bilateral lacunar infarcts in the cerebellum appear chronic.    Small left occipital cortical infarct is stable.  There is a small left posterior parietal remote cortical infarct also without significant change.    Small remote right thalamic lacunar infarct is unchanged.    No parenchymal mass, hemorrhage, edema or major vascular distribution infarct.    Skull/extracranial contents (limited evaluation): No fracture. Mastoid air cells and paranasal sinuses are essentially clear.                                       Medications   aspirin tablet 325 mg (325 mg Oral Given 5/17/25 2026)   sodium chloride 0.9% bolus 1,000 mL 1,000 mL ( Intravenous Stopped 5/17/25 2141)   acetaminophen tablet 1,000 mg (1,000 mg Oral Given 5/17/25 2129)   insulin regular injection 5 Units 0.05 mL (5 Units Intravenous Given 5/17/25 2159)     Medical Decision Making  35 yr old AAF with PMH of seizures since she was a teen, HTN, T2DM, CVA 1/2024 with residual left side weakness to ED with c/o "brain freeze type pain to right side of head", pt states increased generalized weakness and having palpitations since 0200 this morning.  called her step father because she was feeling dizzy and was afraid she would fall while in the bathroom this morning but she did not fall.   Reports she slept most of  today but woke at  1730 this evening and has been feeling anxious and having headache, palpitations and felt like eyes were crossing.   was worried her BS may be "off".   did not take DM or BP medications this morning.   Reports headache has resolved with tylenol, weakness has improved with IV fluids, BS has improved with IV fluids and insulin.  Pt and mother states is at her baseline    Amount and/or Complexity of Data Reviewed  Labs: ordered. Decision-making details documented in ED Course.  Radiology: ordered.     Details: 1. No acute intracranial process with no significant change.  MRI may better " characterize if there is high clinical suspicion.  2. Multiple small remote lacunar infarcts involving the bilateral cerebellum, left occipital cortex, left posterior parietal cortex and right thalamus.  No significant change    ECG/medicine tests: ordered.     Details: NSR HR 95, NO ST elevation  Discussion of management or test interpretation with external provider(s): Telemed via audio/ video with Dr. Avalos who states pt ok to DC home with instruction to no miss doses of medication and f/u with PCP.      Risk  OTC drugs.      Additional MDM:     NIH Stroke Scale:   Interval = baseline (upon arrival/admit)  Level of consciousness = 0 - alert  LOC questions = 0 - answers both correctly  LOC commands = 0 - performs both correctly  Best gaze = 0 - normal  Visual = 0 - no visual loss  Facial palsy = 1 - minor (residual from CVA Jan 2024)  Motor left arm =  1 - drift (residual from CVA Jan 2024)  Motor right arm =  0 - no drift  Motor left leg = 1 - drift (residual from CVA Jan 2024)  Motor right leg =  0 - no drift  Limb ataxia = 0 - absent  Sensory = 0 - normal  Best language = 0 - no aphasia  Dysarthria = 0 - normal articulation  Extinction and inattention = 0 - no neglect  NIH Stroke Scale Total = 3              ED Course as of 05/17/25 2251   Sat May 17, 2025   2036 Glucose(!!): 689  Did not take farxiga this morning and Has not had insulin - reports takes at night.  Reports did take insulin last night. [CG]   2127 Pt reports headache has improved but has not completely resolved.   [CG]   2157 Repeat POC glucose after 1 L NS - 513, will give insulin   [CG]   2157 Waiting for telemed consult.  [CG]   2225 States headache has resolved. [CG]   2230 BS down to 393.  Reports is feeling better.  Awaiting telemed. [CG]   2246 Telemed consult via audio/ video with Dr. Avalos who states since pt is feeling better and BS has improved ok to DC home.  Pt instructed to take medication and NO miss doses.  Pt and mother agree [CG]       ED Course User Index  [CG] Nancy Clark FNP                           Clinical Impression:   Final diagnoses:  [R00.2] Palpitations  [R51.9] Acute nonintractable headache, unspecified headache type  [E11.65] Hyperglycemia due to diabetes mellitus (Primary)  [R53.1] Generalized weakness  [E86.0] Dehydration        ED Disposition Condition    Discharge Stable          ED Prescriptions    None       Follow-up Information       Follow up With Specialties Details Why Contact Info    Miguel Kelly NP Internal Medicine   [address] [place] [address] [id number] [phone]                   [1]   Social History  Tobacco Use    Smoking status: Never    Smokeless tobacco: Never   Substance Use Topics    Alcohol use: Not Currently    Drug use: Never        Nancy Clark FNP  05/17/25 8515

## 2025-05-19 LAB
GLUCOSE SERPL-MCNC: 513 MG/DL (ref 70–105)
GLUCOSE SERPL-MCNC: >600 MG/DL (ref 70–105)
OHS QRS DURATION: 80 MS
OHS QTC CALCULATION: 447 MS

## 2025-07-01 ENCOUNTER — PATIENT OUTREACH (OUTPATIENT)
Facility: OTHER | Age: 35
End: 2025-07-01
Payer: COMMERCIAL

## 2025-07-01 NOTE — PROGRESS NOTES
7/1/25  Ed navigator first follow up outreach attempt-unable to contact patient  Theresa Cole [facility] Lpn-Ed Navigator  Ph# [phone]      7/3/25  Ed navigator second follow up outreach attempt-unable to contact patient  Ed navigator will close encounter at this time  Theresa Cole [facility]-Ed Navigator  # [phone]

## 2025-08-05 ENCOUNTER — PATIENT OUTREACH (OUTPATIENT)
Facility: OTHER | Age: 35
End: 2025-08-05
Payer: MEDICAID

## 2025-08-05 NOTE — PROGRESS NOTES
8/5/25  Ed navigator first follow up outreach attempt-unable to contact patient  Telephone message-we're sorry the patient that you are trying to reach is not available at the tone leave a message  Ed navigator left message to return call to [phone]  Ed navigator will follow up on or around 9/8/25  Ed navigator to close encounter at this time  Theresa Cole [facility]-Ed Navigator  Ph# [phone]